# Patient Record
Sex: MALE | Race: WHITE
[De-identification: names, ages, dates, MRNs, and addresses within clinical notes are randomized per-mention and may not be internally consistent; named-entity substitution may affect disease eponyms.]

---

## 2023-06-16 ENCOUNTER — HOSPITAL ENCOUNTER (OUTPATIENT)
Dept: HOSPITAL 47 - EC | Age: 88
Setting detail: OBSERVATION
LOS: 1 days | Discharge: HOME HEALTH | End: 2023-06-17
Attending: HOSPITALIST | Admitting: HOSPITALIST
Payer: OTHER GOVERNMENT

## 2023-06-16 DIAGNOSIS — G89.29: ICD-10-CM

## 2023-06-16 DIAGNOSIS — Z95.5: ICD-10-CM

## 2023-06-16 DIAGNOSIS — Z79.82: ICD-10-CM

## 2023-06-16 DIAGNOSIS — Z95.2: ICD-10-CM

## 2023-06-16 DIAGNOSIS — I25.10: ICD-10-CM

## 2023-06-16 DIAGNOSIS — M48.061: ICD-10-CM
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
## 2023-06-16 DIAGNOSIS — Z91.81: ICD-10-CM

## 2023-06-16 DIAGNOSIS — I50.9: ICD-10-CM

## 2023-06-16 DIAGNOSIS — R03.0: ICD-10-CM

## 2023-06-16 DIAGNOSIS — Z95.1: ICD-10-CM

## 2023-06-16 DIAGNOSIS — Z95.0: ICD-10-CM

## 2023-06-16 DIAGNOSIS — E03.9: ICD-10-CM

## 2023-06-16 DIAGNOSIS — Z79.890: ICD-10-CM

## 2023-06-16 DIAGNOSIS — N40.0: ICD-10-CM

## 2023-06-16 DIAGNOSIS — S32.009A: Primary | ICD-10-CM

## 2023-06-16 DIAGNOSIS — Z79.899: ICD-10-CM

## 2023-06-16 DIAGNOSIS — E78.5: ICD-10-CM

## 2023-06-16 DIAGNOSIS — Z79.1: ICD-10-CM

## 2023-06-16 LAB
ALBUMIN SERPL-MCNC: 3.5 G/DL (ref 3.5–5)
ALP SERPL-CCNC: 154 U/L (ref 38–126)
ALT SERPL-CCNC: 29 U/L (ref 4–49)
ANION GAP SERPL CALC-SCNC: 6 MMOL/L
APTT BLD: 24.5 SEC (ref 22–30)
AST SERPL-CCNC: 35 U/L (ref 17–59)
BASOPHILS # BLD AUTO: 0 K/UL (ref 0–0.2)
BASOPHILS NFR BLD AUTO: 1 %
BUN SERPL-SCNC: 16 MG/DL (ref 9–20)
CALCIUM SPEC-MCNC: 8.6 MG/DL (ref 8.4–10.2)
CHLORIDE SERPL-SCNC: 102 MMOL/L (ref 98–107)
CO2 SERPL-SCNC: 31 MMOL/L (ref 22–30)
EOSINOPHIL # BLD AUTO: 0.1 K/UL (ref 0–0.7)
EOSINOPHIL NFR BLD AUTO: 2 %
ERYTHROCYTE [DISTWIDTH] IN BLOOD BY AUTOMATED COUNT: 3.79 M/UL (ref 4.3–5.9)
ERYTHROCYTE [DISTWIDTH] IN BLOOD: 15.1 % (ref 11.5–15.5)
GLUCOSE SERPL-MCNC: 99 MG/DL (ref 74–99)
HCT VFR BLD AUTO: 39.5 % (ref 39–53)
HGB BLD-MCNC: 12.6 GM/DL (ref 13–17.5)
INR PPP: 1.1 (ref ?–1.2)
LYMPHOCYTES # SPEC AUTO: 0.8 K/UL (ref 1–4.8)
LYMPHOCYTES NFR SPEC AUTO: 15 %
MAGNESIUM SPEC-SCNC: 2 MG/DL (ref 1.6–2.3)
MCH RBC QN AUTO: 33.3 PG (ref 25–35)
MCHC RBC AUTO-ENTMCNC: 31.9 G/DL (ref 31–37)
MCV RBC AUTO: 104.3 FL (ref 80–100)
MONOCYTES # BLD AUTO: 0.5 K/UL (ref 0–1)
MONOCYTES NFR BLD AUTO: 10 %
NEUTROPHILS # BLD AUTO: 3.6 K/UL (ref 1.3–7.7)
NEUTROPHILS NFR BLD AUTO: 71 %
PLATELET # BLD AUTO: 199 K/UL (ref 150–450)
POTASSIUM SERPL-SCNC: 4.1 MMOL/L (ref 3.5–5.1)
PROT SERPL-MCNC: 6.7 G/DL (ref 6.3–8.2)
PT BLD: 11.7 SEC (ref 9–12)
SODIUM SERPL-SCNC: 139 MMOL/L (ref 137–145)
WBC # BLD AUTO: 5.1 K/UL (ref 3.8–10.6)

## 2023-06-16 PROCEDURE — 85025 COMPLETE CBC W/AUTO DIFF WBC: CPT

## 2023-06-16 PROCEDURE — 81001 URINALYSIS AUTO W/SCOPE: CPT

## 2023-06-16 PROCEDURE — 80053 COMPREHEN METABOLIC PANEL: CPT

## 2023-06-16 PROCEDURE — 84484 ASSAY OF TROPONIN QUANT: CPT

## 2023-06-16 PROCEDURE — 85730 THROMBOPLASTIN TIME PARTIAL: CPT

## 2023-06-16 PROCEDURE — 83735 ASSAY OF MAGNESIUM: CPT

## 2023-06-16 PROCEDURE — 93005 ELECTROCARDIOGRAM TRACING: CPT

## 2023-06-16 PROCEDURE — 96374 THER/PROPH/DIAG INJ IV PUSH: CPT

## 2023-06-16 PROCEDURE — 84100 ASSAY OF PHOSPHORUS: CPT

## 2023-06-16 PROCEDURE — 85610 PROTHROMBIN TIME: CPT

## 2023-06-16 PROCEDURE — 99285 EMERGENCY DEPT VISIT HI MDM: CPT

## 2023-06-16 RX ADMIN — CEFAZOLIN SCH MLS/HR: 330 INJECTION, POWDER, FOR SOLUTION INTRAMUSCULAR; INTRAVENOUS at 20:04

## 2023-06-16 RX ADMIN — KETOROLAC TROMETHAMINE SCH: 15 INJECTION, SOLUTION INTRAMUSCULAR; INTRAVENOUS at 23:27

## 2023-06-17 VITALS
DIASTOLIC BLOOD PRESSURE: 49 MMHG | TEMPERATURE: 97.8 F | SYSTOLIC BLOOD PRESSURE: 97 MMHG | RESPIRATION RATE: 16 BRPM | HEART RATE: 64 BPM

## 2023-06-17 LAB
HYALINE CASTS UR QL AUTO: 4 /LPF (ref 0–2)
PH UR: 5 [PH] (ref 5–8)
PROT UR QL: (no result)
RBC UR QL: 3 /HPF (ref 0–5)
SP GR UR: 1.03 (ref 1–1.03)
UROBILINOGEN UR QL STRIP: <2 MG/DL (ref ?–2)
WBC #/AREA URNS HPF: 4 /HPF (ref 0–5)

## 2023-06-17 RX ADMIN — CEFAZOLIN SCH MLS/HR: 330 INJECTION, POWDER, FOR SOLUTION INTRAMUSCULAR; INTRAVENOUS at 08:40

## 2023-06-17 RX ADMIN — KETOROLAC TROMETHAMINE SCH: 15 INJECTION, SOLUTION INTRAMUSCULAR; INTRAVENOUS at 06:09

## 2023-06-17 RX ADMIN — FLUTICASONE PROPIONATE SCH: 110 AEROSOL, METERED RESPIRATORY (INHALATION) at 21:39

## 2023-06-17 RX ADMIN — KETOROLAC TROMETHAMINE SCH: 15 INJECTION, SOLUTION INTRAMUSCULAR; INTRAVENOUS at 17:27

## 2023-06-17 RX ADMIN — FLUTICASONE PROPIONATE SCH: 110 AEROSOL, METERED RESPIRATORY (INHALATION) at 07:32

## 2023-06-17 RX ADMIN — KETOROLAC TROMETHAMINE SCH: 15 INJECTION, SOLUTION INTRAMUSCULAR; INTRAVENOUS at 14:10

## 2023-06-17 NOTE — P.DS
Providers


Date of admission: 


06/16/23 18:57





Attending physician: 


Aster Shahid





Consults: 





                                        





06/16/23 18:54


Consult Physician Routine 


   Consulting Provider: SUSIE Galicia


   Consult Reason/Comments: backPain


   Do you want consulting provider notified?: Yes











Primary care physician: 


[facility] Course: 





88-year-old male came in with the sudden onset of lower back pain and the 

patient is found to have compression of lumbar vertebrae denied any lower 

extremity weakness or tingling numbness.  Patient is on scheduled Toradol with 

which his symptoms are improved.  Patient was evaluated by orthopedic surgery 

and recommended TLSO brace and if his pain is controlled on tramadol, patient 

can be discharged.  She apparently has history of congestive heart failure Lasix

with borderline blood pressure and normal kidney function.











REVIEW OF SYSTEMS: 


CONSTITUTIONAL: No fever, no malaise, no fatigue. 


HEENT: No recent visual problems or hearing problems. Denied any sore throat. 


CARDIOVASCULAR: No chest pain, orthopnea, PND, no palpitations, no syncope. 


PULMONARY: No shortness of breath, no cough, no hemoptysis. 


GASTROINTESTINAL: No diarrhea, no nausea, no vomiting, no abdominal pain. 


NEUROLOGICAL: No headaches, no weakness, no numbness. 


HEMATOLOGICAL: Denies any bleeding or petechiae. 


GENITOURINARY: Denies any burning micturition, frequency, or urgency. 


MUSCULOSKELETAL/RHEUMATOLOGICAL: As mentioned above 


ENDOCRINE: Denies any polyuria or polydipsia. 





The rest of the 14-point review of systems is negative.











PHYSICAL EXAMINATION: 





GENERAL: The patient is alert and oriented x3, not in any acute distress. Well 

developed, well nourished. 


HEENT: Pupils are round and equally reacting to light. EOMI. No scleral icterus.

No conjunctival pallor. Normocephalic, atraumatic. No pharyngeal erythema. No 

thyromegaly. 


CARDIOVASCULAR: S1 and S2 present. No murmurs, rubs, or gallops. 


PULMONARY: Chest is clear to auscultation, no wheezing or crackles. 


ABDOMEN: Soft, nontender, nondistended, normoactive bowel sounds. No palpable 

organomegaly. 


MUSCULOSKELETAL: No joint swelling or deformity.


EXTREMITIES: No cyanosis, clubbing, or pedal edema. 


NEUROLOGICAL: Gross neurological examination did not reveal any focal deficits. 


SKIN: No rashes. 





Assessment and plan


-Compression fracture: TLSO brace, tramadol, meloxicam as needed possibly of 

discharge with outpatient physical therapy


-Coronary artery disease


-Possible congestive heart failure although no echocardiogram is available 

unknown whether patient has systolic dysfunction and diastolic dysfunction 

presently not in acute exacerbation, patient will be continued on his home 

medications although his blood pressures are fairly borderline and there is a 

concern for falls


-History of valvular heart disease


Benign prostatic hypertrophy


-Hyperlipidemia


-Hypothyroidism


-Possible history of atrial fibrillation presently sinus rhythm, not on 

anticoagulation because of fall risk





Possibility of discharge today





Patient Condition at Discharge: Good





Plan - Discharge Summary


Discharge Rx Participant: No


New Discharge Prescriptions: 


New


   traMADol HCl [Ultram] 50 mg PO Q6HR PRN 3 Days #12 tab


     PRN Reason: Pain


   Meloxicam [Mobic] 15 mg PO DAILY PRN #30 tab


     PRN Reason: Pain





Continue


   Acetaminophen Tab [Tylenol] 650 mg PO Q4H PRN


     PRN Reason: Fever And/ Or Pain


   Amiodarone [Cordarone] 200 mg PO DAILY


   Ascorbic Acid [Vitamin C] 500 mg PO DAILY


   Aspirin EC [Ecotrin Low Dose] 81 mg PO DAILY


   Budesonide [Pulmicort Flexhaler] 2 puff INHALATION RT-BID


   Cholecalciferol [Vitamin D3 (25 Mcg = 1000 Iu)] 25 mcg PO DAILY


   Furosemide [Lasix] 60 mg PO AC-BRKFST


   Levalbuterol Hfa Inhaler [Xopenex Hfa Inhaler] 2 puff INHALATION RT-Q8H PRN


     PRN Reason: Shortness Of Breath


   Metoprolol Tartrate [Lopressor] 12.5 mg PO BID


   Multivit-Mins/Iron/Folic/Lycop [Centrum Men's Tablet] 1 tab PO DAILY


   Pantoprazole Sodium [Protonix] 40 mg PO DAILY


   Sennosides/Docusate Sodium [Senna-S 8.6-50 mg Tablet] 1 tab PO BID


   Tamsulosin HCl [Flomax] 0.4 mg PO DAILY


   Atorvastatin [Lipitor] 80 mg PO DAILY


   Isosorbide Mononitrate ER [Imdur] 30 mg PO DAILY


   Levothyroxine Sodium [Synthroid] 50 mcg PO DAILY


   Loratadine [Claritin] 10 mg PO DAILY


   Nitroglycerin Sl Tabs [Nitrostat] 0.4 mg SUBLINGUAL Q5M PRN


     PRN Reason: Chest Pain


   Potassium Chloride ER [K-Dur 20] 20 meq PO DAILY


   Ranolazine [Ranexa] 500 mg PO BID





Discontinued


   Zolpidem [Ambien] 10 mg PO HS PRN


     PRN Reason: Insomnia


Discharge Medication List





Acetaminophen Tab [Tylenol] 650 mg PO Q4H PRN 06/16/23 [History]


Amiodarone [Cordarone] 200 mg PO DAILY 06/16/23 [History]


Ascorbic Acid [Vitamin C] 500 mg PO DAILY 06/16/23 [History]


Aspirin EC [Ecotrin Low Dose] 81 mg PO DAILY 06/16/23 [History]


Atorvastatin [Lipitor] 80 mg PO DAILY 06/16/23 [History]


Budesonide [Pulmicort Flexhaler] 2 puff INHALATION RT-BID 06/16/23 [History]


Cholecalciferol [Vitamin D3 (25 Mcg = 1000 Iu)] 25 mcg PO DAILY 06/16/23 

[History]


Furosemide [Lasix] 60 mg PO AC-BRKFST 06/16/23 [History]


Isosorbide Mononitrate ER [Imdur] 30 mg PO DAILY 06/16/23 [History]


Levalbuterol Hfa Inhaler [Xopenex Hfa Inhaler] 2 puff INHALATION RT-Q8H PRN 

06/16/23 [History]


Levothyroxine Sodium [Synthroid] 50 mcg PO DAILY 06/16/23 [History]


Loratadine [Claritin] 10 mg PO DAILY 06/16/23 [History]


Metoprolol Tartrate [Lopressor] 12.5 mg PO BID 06/16/23 [History]


Multivit-Mins/Iron/Folic/Lycop [Centrum Men's Tablet] 1 tab PO DAILY 06/16/23 

[History]


Nitroglycerin Sl Tabs [Nitrostat] 0.4 mg SUBLINGUAL Q5M PRN 06/16/23 [History]


Pantoprazole Sodium [Protonix] 40 mg PO DAILY 06/16/23 [History]


Potassium Chloride ER [K-Dur 20] 20 meq PO DAILY 06/16/23 [History]


Ranolazine [Ranexa] 500 mg PO BID 06/16/23 [History]


Sennosides/Docusate Sodium [Senna-S 8.6-50 mg Tablet] 1 tab PO BID 06/16/23 

[History]


Tamsulosin HCl [Flomax] 0.4 mg PO DAILY 06/16/23 [History]


Meloxicam [Mobic] 15 mg PO DAILY PRN #30 tab 06/17/23 [Rx]


traMADol HCl [Ultram] 50 mg PO Q6HR PRN 3 Days #12 tab 06/17/23 [Rx]








Follow up Appointment(s)/Referral(s): 


SUSIE Galicia,  [Doctor of Osteopathic Medicine] - 2 Weeks (or sooner if 

patient is having any problems)


None,Stated [REFERRING] - 1-2 days


Activity/Diet/Wound Care/Special Instructions: 


LSO brace to be worn when patient is out of bed


May remove in bed or may wear in bed if patient wishes


May remove for bathing


May ambulate as tolerated with brace on


Avoid any heavy rigorous activity


Avoid any repetitive bending


Avoid lifting greater than 15 pounds


No overhead work


Discharge Disposition: HOME SELF-CARE

## 2023-06-17 NOTE — P.CNOR
History of Present Illness





- [facility]


Consult date: 06/17/23


Consult reason: low back pain


History of present illness: 





The patient is seen and examined at bedside.  He is a very pleasant 88-year-old 

male who lives on his own with his son right next or checks in on him daily.  

The patient just returned back from [place] but when he went to take a shower 

about 4 weeks ago he had a fall and had sudden acute pain at his lower back.  

The pain has persisted and feels though it had gotten worse and presented to the

hospital.  He underwent imaging last week at [facility].  He was found to 

have evidence of a compression deformity and was sent into the hospital.


The patient is not having any lower extremity numbness tingling.  He is not 

having change in bowel bladder function.  He says the pain is primarily at the 

midline of his lumbosacral junction and across his lower back.  It is 

particularly bad when he changed physicians and try to sit up at the side of the

bed.  He is not having pain when seated or when lying down.  He denies pain with

coughing or sneezing.





He denies problems with his back in the past.  He says he does have some 

soreness at his legs when he tries to stand up and walk for significant period 

time.  He denies any new weakness





Review of Systems





Denies chest pain shortness breath.  Denies nausea or vomiting.  Denies any 

fevers chills.  He admits to fall several weeks ago.  He says he normally gets 

around home well uses a walker to help get around.  He lives on his own at home 

and feels that he is able to take care of the house but he has family very close

by who visits him each day.





Past Medical History


Past Medical History: Coronary Artery Disease (CAD)


Additional Past Medical History / Comment(s): Status post fall about 4 weeks ago

with new back pain


History of Any Multi-Drug Resistant Organisms: None Reported


Past Surgical History: Coronary Bypass/CABG, Heart Catheterization With Stent, 

Pacemaker


Additional Past Surgical History / Comment(s): heart valve replacemnt


Date of Last Stent Placement:: n/a


Type of Cardiac Device: Permanent Pacemaker


Device Placement Date:: n/a


Past Psychological History: No Psychological Hx Reported


Past Alcohol Use History: Rare


Past Drug Use History: None Reported





Medications and Allergies


                                Home Medications











 Medication  Instructions  Recorded  Confirmed  Type


 


Acetaminophen Tab [Tylenol] 650 mg PO Q4H PRN 06/16/23 06/16/23 History


 


Amiodarone [Cordarone] 200 mg PO DAILY 06/16/23 06/16/23 History


 


Ascorbic Acid [Vitamin C] 500 mg PO DAILY 06/16/23 06/16/23 History


 


Aspirin EC [Ecotrin Low Dose] 81 mg PO DAILY 06/16/23 06/16/23 History


 


Atorvastatin [Lipitor] 80 mg PO DAILY 06/16/23 06/16/23 History


 


Budesonide [Pulmicort Flexhaler] 2 puff INHALATION RT-BID 06/16/23 06/16/23 

History


 


Cholecalciferol [Vitamin D3 (25 25 mcg PO DAILY 06/16/23 06/16/23 History





Mcg = 1000 Iu)]    


 


Furosemide [Lasix] 60 mg PO AC-BRKFST 06/16/23 06/16/23 History


 


Isosorbide Mononitrate ER [Imdur] 30 mg PO DAILY 06/16/23 06/16/23 History


 


Levalbuterol Hfa Inhaler [Xopenex 2 puff INHALATION RT-Q8H PRN 06/16/23 06/16/23

 History





Hfa Inhaler]    


 


Levothyroxine Sodium [Synthroid] 50 mcg PO DAILY 06/16/23 06/16/23 History


 


Loratadine [Claritin] 10 mg PO DAILY 06/16/23 06/16/23 History


 


Metoprolol Tartrate [Lopressor] 12.5 mg PO BID 06/16/23 06/16/23 History


 


Multivit-Mins/Iron/Folic/Lycop 1 tab PO DAILY 06/16/23 06/16/23 History





[Centrum Men's Tablet]    


 


Nitroglycerin Sl Tabs [Nitrostat] 0.4 mg SUBLINGUAL Q5M PRN 06/16/23 06/16/23 

History


 


Pantoprazole Sodium [Protonix] 40 mg PO DAILY 06/16/23 06/16/23 History


 


Potassium Chloride ER [K-Dur 20] 20 meq PO DAILY 06/16/23 06/16/23 History


 


Ranolazine [Ranexa] 500 mg PO BID 06/16/23 06/16/23 History


 


Sennosides/Docusate Sodium 1 tab PO BID 06/16/23 06/16/23 History





[Senna-S 8.6-50 mg Tablet]    


 


Tamsulosin HCl [Flomax] 0.4 mg PO DAILY 06/16/23 06/16/23 History


 


Zolpidem [Ambien] 10 mg PO HS PRN 06/16/23 06/16/23 History








                                    Allergies











Allergy/AdvReac Type Severity Reaction Status Date / Time


 


No Known Allergies Allergy   Verified 06/16/23 19:49














Physical Examination


Osteopathic Statement: *.  No significant issues noted on an osteopathic 

structural exam other than those noted in the History and Physical/Consult.





- L Spine: dermatomal strength & reflexes


  ** bilateral


Strength: hip flexion: 5/5 (He has bruising over his pelvis toward left.  There 

is open area of burn about 3 x 3 cm from a heating pad.  There is no erythema 

there is no drainage.)





  ** left


Strength: hip flexion: 5/5 (His lower extremities have 5 out of 5 strength 

throughout.  No pain with internal rotation is hips.  Thighs Soft nontender.  

There is ecchymosis over his left gluteal area.)





Results





- Labs


Labs: 


                  Abnormal Lab Results - Last 24 Hours (Table)











  06/16/23 06/16/23 Range/Units





  19:35 19:35 


 


RBC  3.79 L   (4.30-5.90)  m/uL


 


Hgb  12.6 L   (13.0-17.5)  gm/dL


 


MCV  104.3 H   (80.0-100.0)  fL


 


Lymphocytes #  0.8 L   (1.0-4.8)  k/uL


 


Carbon Dioxide   31 H  (22-30)  mmol/L


 


Alkaline Phosphatase   154 H  ()  U/L








                                      H & H











  06/16/23 Range/Units





  19:35 


 


Hgb  12.6 L  (13.0-17.5)  gm/dL


 


Hct  39.5  (39.0-53.0)  %








                                   Coagulation











  06/16/23 Range/Units





  19:35 


 


INR  1.1  (<1.2)  











Result Diagrams: 


                                 06/16/23 19:35





                                 06/16/23 19:35





- Diagnostic results


CT Scan - lumbar: report reviewed (I do not have the report available.  There is

 evidence of severe central and bilateral foraminal stenosis L3 4 L4 5 with 

facet arthrosis.), image reviewed (Computed tomography scan from 06/09/2023 at 

[facility] shows his lumbar spine with evidence of compression deformity at 

L5.  There is some lumbarization of S1.  There is fracture line at the superior 

endplate and about 10% height loss.  There is some evidence of significant 

central bilateral for)





Assessment and Plan


Assessment: 





Subacute low back pain


L4 5 compression fracture with about 10% height loss due to a traumatic fall 

about 4 weeks ago


Spinal stenosis L3 4 L4 5


Difficulty ambulating and mobilizing


Superficial burn due to a heating pad at the left lower back


Ecchymosis over the gluteal area


Plan: 





Subacute low back pain


L5 compression fracture with about 10% height loss due to a traumatic fall about

 4 weeks ago


Spinal stenosis L3 4 L4 5


Difficulty ambulating and mobilizing


Superficial burn due to a heating pad at the left lower back


Ecchymosis over the gluteal area





The patient has new low back pain due to compression fracture L4.  This has made

 it difficult for him to mobilize and ambulate.  The fracture does not appear to

 be causing any new neurologic change or neurologic deficit.  He does have 

evidence of significant central and bilateral foraminal stenosis at L3 4 and L4 

5 which is chronic for him.  It had previously been minimally symptomatic 

although I think the fall and his new injury has exacerbated some of the 

arthritic and chronic changes to become somewhat symptomatic from his stenosis. 

 We discussed this with him today length and discuss it with his son over the 

phone at bedside.





I think that we need to manage the fracture currently to allow to stabilize 

before we can address the issues of stenosis.  He is not having neurologic F sit

 or neurologic change.  I discussed the different treatment options in regards 

to his L5 compression fracture.  I think that he can do well with an LSO brace 

for his lower back and continue mobilization with therapy.  He may do well with 

some pain control as well. 





If he is not doing well with conservative treatment and bracing I did discuss 

the possibility of kyphoplasty.  For now we will like to try to avoid surgical 

intervention as he does have a open sore at his lower back and with his advanced

 age.  Try to avoid surgical intervention if possible.  Kyphoplasty could be an 

option if he is not having significant improvement, but we'll initiate dedicated

 conservative treatment at this point and see how he does.  We will continue to 

follow him along with you. 





 I will order an LSO brace and pain control for him.  We will also have therapy 

work with him for mobilization and assess his home situation with case 

management for potential discharge if his pain is controlled.


Time with Patient: Greater than 30

## 2023-06-17 NOTE — P.HPIM
History of Present Illness


88-year-old male came in with the sudden onset of lower back pain and the 

patient is found to have compression of lumbar vertebrae denied any lower 

extremity weakness or tingling numbness.  Patient is on scheduled Toradol with 

which his symptoms are improved.  Patient was evaluated by orthopedic surgery 

and recommended TLSO brace and if his pain is controlled on tramadol, patient 

can be discharged.  She apparently has history of congestive heart failure Lasix

with borderline blood pressure and normal kidney function.











REVIEW OF SYSTEMS: 


CONSTITUTIONAL: No fever, no malaise, no fatigue. 


HEENT: No recent visual problems or hearing problems. Denied any sore throat. 


CARDIOVASCULAR: No chest pain, orthopnea, PND, no palpitations, no syncope. 


PULMONARY: No shortness of breath, no cough, no hemoptysis. 


GASTROINTESTINAL: No diarrhea, no nausea, no vomiting, no abdominal pain. 


NEUROLOGICAL: No headaches, no weakness, no numbness. 


HEMATOLOGICAL: Denies any bleeding or petechiae. 


GENITOURINARY: Denies any burning micturition, frequency, or urgency. 


MUSCULOSKELETAL/RHEUMATOLOGICAL: As mentioned above 


ENDOCRINE: Denies any polyuria or polydipsia. 





The rest of the 14-point review of systems is negative.











PHYSICAL EXAMINATION: 





GENERAL: The patient is alert and oriented x3, not in any acute distress. Well 

developed, well nourished. 


HEENT: Pupils are round and equally reacting to light. EOMI. No scleral icterus.

No conjunctival pallor. Normocephalic, atraumatic. No pharyngeal erythema. No 

thyromegaly. 


CARDIOVASCULAR: S1 and S2 present. No murmurs, rubs, or gallops. 


PULMONARY: Chest is clear to auscultation, no wheezing or crackles. 


ABDOMEN: Soft, nontender, nondistended, normoactive bowel sounds. No palpable 

organomegaly. 


MUSCULOSKELETAL: No joint swelling or deformity.


EXTREMITIES: No cyanosis, clubbing, or pedal edema. 


NEUROLOGICAL: Gross neurological examination did not reveal any focal deficits. 


SKIN: No rashes. 





Assessment and plan


-Compression fracture: TLSO brace, tramadol, meloxicam as needed possibly of 

discharge with outpatient physical therapy


-Coronary artery disease


-Possible congestive heart failure although no echocardiogram is available 

unknown whether patient has systolic dysfunction and diastolic dysfunction 

presently not in acute exacerbation, patient will be continued on his home 

medications although his blood pressures are fairly borderline and there is a 

concern for falls


-History of valvular heart disease


Benign prostatic hypertrophy


-Hyperlipidemia


-Hypothyroidism


-Possible history of atrial fibrillation presently sinus rhythm, not on 

anticoagulation because of fall risk





Possibility of discharge today








Past Medical History


Past Medical History: Coronary Artery Disease (CAD)


Additional Past Medical History / Comment(s): Status post fall about 4 weeks ago

with new back pain


History of Any Multi-Drug Resistant Organisms: None Reported


Past Surgical History: Coronary Bypass/CABG, Heart Catheterization With Stent, 

Pacemaker


Additional Past Surgical History / Comment(s): heart valve replacemnt


Date of Last Stent Placement:: n/a


Type of Cardiac Device: Permanent Pacemaker


Device Placement Date:: n/a


Past Psychological History: No Psychological Hx Reported


Past Alcohol Use History: Rare


Past Drug Use History: None Reported





Medications and Allergies


                                Home Medications











 Medication  Instructions  Recorded  Confirmed  Type


 


Acetaminophen Tab [Tylenol] 650 mg PO Q4H PRN 06/16/23 06/16/23 History


 


Amiodarone [Cordarone] 200 mg PO DAILY 06/16/23 06/16/23 History


 


Ascorbic Acid [Vitamin C] 500 mg PO DAILY 06/16/23 06/16/23 History


 


Aspirin EC [Ecotrin Low Dose] 81 mg PO DAILY 06/16/23 06/16/23 History


 


Atorvastatin [Lipitor] 80 mg PO DAILY 06/16/23 06/16/23 History


 


Budesonide [Pulmicort Flexhaler] 2 puff INHALATION RT-BID 06/16/23 06/16/23 

History


 


Cholecalciferol [Vitamin D3 (25 25 mcg PO DAILY 06/16/23 06/16/23 History





Mcg = 1000 Iu)]    


 


Furosemide [Lasix] 60 mg PO AC-BRKFST 06/16/23 06/16/23 History


 


Isosorbide Mononitrate ER [Imdur] 30 mg PO DAILY 06/16/23 06/16/23 History


 


Levalbuterol Hfa Inhaler [Xopenex 2 puff INHALATION RT-Q8H PRN 06/16/23 06/16/23

 History





Hfa Inhaler]    


 


Levothyroxine Sodium [Synthroid] 50 mcg PO DAILY 06/16/23 06/16/23 History


 


Loratadine [Claritin] 10 mg PO DAILY 06/16/23 06/16/23 History


 


Metoprolol Tartrate [Lopressor] 12.5 mg PO BID 06/16/23 06/16/23 History


 


Multivit-Mins/Iron/Folic/Lycop 1 tab PO DAILY 06/16/23 06/16/23 History





[Centrum Men's Tablet]    


 


Nitroglycerin Sl Tabs [Nitrostat] 0.4 mg SUBLINGUAL Q5M PRN 06/16/23 06/16/23 

History


 


Pantoprazole Sodium [Protonix] 40 mg PO DAILY 06/16/23 06/16/23 History


 


Potassium Chloride ER [K-Dur 20] 20 meq PO DAILY 06/16/23 06/16/23 History


 


Ranolazine [Ranexa] 500 mg PO BID 06/16/23 06/16/23 History


 


Sennosides/Docusate Sodium 1 tab PO BID 06/16/23 06/16/23 History





[Senna-S 8.6-50 mg Tablet]    


 


Tamsulosin HCl [Flomax] 0.4 mg PO DAILY 06/16/23 06/16/23 History


 


Zolpidem [Ambien] 10 mg PO HS PRN 06/16/23 06/16/23 History


 


Meloxicam [Mobic] 15 mg PO DAILY PRN #30 tab 06/17/23  Rx


 


traMADol HCl [Ultram] 50 mg PO Q6HR PRN 3 Days #12 tab 06/17/23  Rx








                                    Allergies











Allergy/AdvReac Type Severity Reaction Status Date / Time


 


No Known Allergies Allergy   Verified 06/16/23 19:49














Physical Exam


Vitals: 


                                   Vital Signs











  Temp Pulse Pulse Resp BP BP Pulse Ox


 


 06/17/23 07:00  97.6 F   66  14   106/54  93 L


 


 06/17/23 03:10  97.8 F   70  16   95/55  94 L


 


 06/17/23 02:00    78  18   


 


 06/16/23 22:05  97.5 F L   78  18   135/70  97


 


 06/16/23 20:02   72    121/65   95


 


 06/16/23 17:55  97.3 F L  77   20  115/61   94 L








                                Intake and Output











 06/16/23 06/17/23 06/17/23





 22:59 06:59 14:59


 


Intake Total   118


 


Balance   118


 


Intake:   


 


  Oral   118


 


Other:   


 


  # Voids  1 


 


  Weight 95.254 kg  














Results


CBC & Chem 7: 


                                 06/16/23 19:35





                                 06/16/23 19:35


Labs: 


                  Abnormal Lab Results - Last 24 Hours (Table)











  06/16/23 06/16/23 06/17/23 Range/Units





  19:35 19:35 10:10 


 


RBC  3.79 L    (4.30-5.90)  m/uL


 


Hgb  12.6 L    (13.0-17.5)  gm/dL


 


MCV  104.3 H    (80.0-100.0)  fL


 


Lymphocytes #  0.8 L    (1.0-4.8)  k/uL


 


Carbon Dioxide   31 H   (22-30)  mmol/L


 


Alkaline Phosphatase   154 H   ()  U/L


 


Urine Protein    1+ H  (Negative)  


 


Amorphous Sediment    Rare H  (None)  /hpf


 


Hyaline Casts    4 H  (0-2)  /lpf


 


Urine Mucus    Many H  (None)  /hpf














Thrombosis Risk Factor Assmnt





- Choose All That Apply


Any of the Below Risk Factors Present?: Yes


Each Factor Represents 1 point: Obesity (BMI >25)


Other Risk Factors: Yes


Each Risk Factor Represents 3 Points: Age 75 years or older


Other congenital or acquired thrombophilia - If yes, enter type in comment: No


Thrombosis Risk Factor Assessment Total Risk Factor Score: 4


Thrombosis Risk Factor Assessment Level: Moderate Risk

## 2023-08-18 ENCOUNTER — HOSPITAL ENCOUNTER (OUTPATIENT)
Dept: HOSPITAL 47 - LABPAT | Age: 88
Discharge: HOME | End: 2023-08-18
Attending: ORTHOPAEDIC SURGERY
Payer: OTHER GOVERNMENT

## 2023-08-18 DIAGNOSIS — R94.31: ICD-10-CM

## 2023-08-18 DIAGNOSIS — Z01.818: Primary | ICD-10-CM

## 2023-08-18 DIAGNOSIS — I45.10: ICD-10-CM

## 2023-08-18 DIAGNOSIS — I49.8: ICD-10-CM

## 2023-08-18 DIAGNOSIS — M48.56XA: ICD-10-CM

## 2023-08-18 LAB
ANION GAP SERPL CALC-SCNC: 11.8 MMOL/L (ref 4–12)
APTT BLD: 25.3 SEC (ref 22–30)
BUN SERPL-SCNC: 32.8 MG/DL (ref 9–27)
CHLORIDE SERPL-SCNC: 90 MMOL/L (ref 96–109)
CO2 SERPL-SCNC: 34.2 MMOL/L (ref 21.6–31.8)
GLUCOSE SERPL-MCNC: 114 MG/DL (ref 70–110)
INR PPP: 1 (ref ?–1.2)
MAGNESIUM SPEC-SCNC: 2.4 MG/DL (ref 1.5–2.4)
POTASSIUM SERPL-SCNC: 3.1 MMOL/L (ref 3.5–5.5)
PT BLD: 10.9 SEC (ref 9–12)
SODIUM SERPL-SCNC: 136 MMOL/L (ref 135–145)

## 2023-08-18 PROCEDURE — 84520 ASSAY OF UREA NITROGEN: CPT

## 2023-08-18 PROCEDURE — 83735 ASSAY OF MAGNESIUM: CPT

## 2023-08-18 PROCEDURE — 82565 ASSAY OF CREATININE: CPT

## 2023-08-18 PROCEDURE — 93005 ELECTROCARDIOGRAM TRACING: CPT

## 2023-08-18 PROCEDURE — 85025 COMPLETE CBC W/AUTO DIFF WBC: CPT

## 2023-08-18 PROCEDURE — 85730 THROMBOPLASTIN TIME PARTIAL: CPT

## 2023-08-18 PROCEDURE — 85610 PROTHROMBIN TIME: CPT

## 2023-08-18 PROCEDURE — 80051 ELECTROLYTE PANEL: CPT

## 2023-08-18 PROCEDURE — 82947 ASSAY GLUCOSE BLOOD QUANT: CPT

## 2023-08-18 NOTE — P.HPOR
History of Present Illness


H&P Date: 23





.D:Date: 23 : 01:30pm


.T:Title: [facility] 





Date of Birth:[birth year]





[id number]: NKDA





Age: 88 year


Height: 5'10"


Weight: 182 lbs


BMI: 26.11 kg/m2


Occupation: 


VAS:





CHIEF 

COMPLAINT:


 


Low back pain 





DOI:





DOS:





Duration of current treatment regiment: DEL *





HISTORY:

 











Xrays


 New xrays taken in office 


 


Trauma or injury


 No 


 


Work-Related


 No 


 


Pain description


 Dull & aching


 


Location


 [facility] 





 


Activity Modification Yes 


 


Hand Dominance


 Right 








TREATMENTS COMPLETED:











6 weeks of PT completed?





Month and Year of last PT date? No 











 


Physician directed home exercise completed?


 No


 


Medications Yes, List: Norco, Tramadol, Motrin, Gabapentin, Flexeril





 


Alternative interventions Chiropractic: No


Massage therapy:No


R.I.C.E: YES


Brace:YES LSO, Does not help 


 


Injections No 


RFA:No








SUBJECTIVE:

 


Mr. Junior presents to the office for an evaluation of his low back pain and for

surgicla discussion.  He states pain in his back that is constant and not 

getting any better.  He is with his son who states about 5 weeks ago he had a 

fall at home and has been in constant pain ever since.  He was seen in ED up in 

[place] and they treated him with pain meds and an LSO brace which he has been 

wearing.  He states when he sits his pain is a 4 and when he gets up or moves at

all its 10/10.  He states weakness and numbness in his legs as well which may or

maynot be related.  His son states he sits most of the day now but before this 

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 

dealership.  But this has limited him extremly.  He has tried Norco, Tramadol, 

Flexeril, Gabapentin without imrpvement.  He would like to discuss surgical 

treatment as the brace is not helping him either. 





Otherwise the patient denies any f/c/sob/cp, no incision concerns, no bladder or

bowel retention/incontinence, no perineal numbness/tingling, and ambulates 

independently. 


 


The patients' past social, medical, family, surgical history, as well as review 

of systems, have been reviewed. Please refer to the Neurosurgery History and 

Physical form that has been scanned in to our electronic medical record system.





14 points review of systems completed and as stated in HPI, all other systems 

reviewed are negative. 





Social History: 


G5Adcbmbn: never a smoker P3 


Alcohol: none 





Family History: 


R1Vhnojn:  P2 


Father:  


Sibling(s): 





Past Medical History: Reviewed, see appropriate section of the chart for 

details. 





Past Surgical History: Reviewed, see appropriate section of the chart for 

details. 





Current Medications: Reviewed, see appropriate section of the chart for details.




P1 


PHYSICAL EXAMINATION:




 


General: DEL Awake, alert, appropriate for age, in no acute distress. 


HEENT: DEL No unusual neck masses around region of lateral neck triangle, 

thyroid, supraclavicular groove 





Extremities: DELSkin warm and dry without acute lesions, coloration, 

temperature, skin intact, no tenderness or erythema * 





Integument: 


Hairy patches: DEL * ABSENT


Dorsal skin dimples: DEL * ABSENT


Cafe au lait spots: DEL * ABSENT


Surgical incisions: DEL * NONE





Palpation: 


Please see Pain drawing on Intake sheet for further detail. 


* Midline spinal tenderness: YES at L4 region E6


Cervical Tenderness: No E6


Paralumbar tenderness: YES E6 


Parathoracic tenderness: No E6


Buttocks tenderness: No E6


Sacroilliac Tenderness: DEL YES No Laterality: DEL * 





POSTURAL and MUSCULO-SKELETAL EVALUATION: 


Coronal Balance: DEL * NEUTRAL


Recumbent testing: DEL Patient is * able to lay flat on back 


Sagittal Balance: DEL * NEUTRAL


Shoulder Profile: DEL * LEVEL


Pelvic Girdle: DEL * LEVEL


Neck ROM: DEL * UNRESTRICTED


Lumbar ROM: DEL * RESTRICTED


Shoulder ROM: DEL * Symmetrical


Hip ROM: DEL * Symmetrical


Knee ROM: DEL * Symmetrical


Hands: DEL * Normal appearance, symmetrical


Feet: DEL * Normal appearance, Symmetrical





VASCULAR STATUS :


  LEFT

 RIGHT 











Wrist Pulses * INTACT * INTACT


 


Pedal Pulses 


(Dors. pedis & post.tibialis)


 * INTACT


 * INTACT


 


Color  * NORMAL  * NORMAL


 


Edema Absent PRESENT  Absent PRESENT 








NEUROLOGIC EXAMINATION: 





Mental Status:Awake and alert, fully oriented, with normal attention, 

concentration and memory, and fluent, appropriate speech. 





Cranial Nerves: 


I: Olfactory not tested. 


II: Visual acuity normal, no visual field deficit noted with confrontation. 


III,IV: Normal pupillary reflexes & intact extraocular movements without 

nystagmus. 


V,VI: Intact symmetrical facial sensation. 


VII: Intact symmetrical facial motor movement 


VIII: Hearing intact. 


IX,X: Intact gag, swallow, & normal voice. 


XI: Sternocleidomastoid, trapezius function intact. 


XII: Tongue midline with normal movements. 





L'hermitte's Sign: DEL Negative / absent 


Spurling'Sign: DEL Absent bilaterally.  


Cubital percussion test: DEL Absent bilaterally. 


Johnston-Tinel sign - Carpal region: DEL Absent bilaterally. 


Straight Leg Raising: DEL Absent bilaterally. * 


Crossed straight leg raise: DEL negative positive O8 + test in affected 

leg while raising opposite leg, MORE SPECIFIC than regular straight leg raise 








MOTOR EXAM (0-5/5, N/T





Muscle appearance: DEL *Symmetrical, without signs of atrophy or dystrophy











UPPER EXTREMITY RIGHT LEFT


 


Shoulder Abduction *5/5 *5/5


 


Biceps * */


 


Triceps */ *5/5


 


Wrist Extension */ *5/5


 


Hand Intrnsics * */


 


 * */








Hand and finger dexterity intact bilaterally? DEL YES NO 


Disdiadochokinesis examination negative bilaterally? DEL YES NO











LOWER EXTREMITY RIGHT LEFT


 


Hip Flexion *4/5 *4/5


 


Knee Extension */5 *4/5


 


Knee Flexion */ */5


 


Dorsiflexion */5 *4/5


 


Plantarflexion */5 *4/5


 


EHL */5 *4/5


 


FHL */5 *4/5


 


  








Toe heel walk / heel-toe walk intact while maintaining satisfactory balance? 

DELYES No 


Squatting/straightening w/o assistance to a min of 60 degree knee flexion?DEL 

YES No  


Single leg stance: DEL INTACT TRENDELENBURG NEG unable to do due to 

pain





REFLEXES(0-4/2, NT)Upper 

ExtremityLower Extremity











Right * 2 * 2


 


Left * 2 * 2 











Pathological Reflexes 

RIGHT LEFT











Johnston's Absent PRESENT Absent PRESENT


 


Clonus Absent #BEATS Absent #BEATS


 


Babinski Absent PRESENT Absent PRESENT











Sensory system (0-4, N/T) 











                              Test type RU TERA RL LL


 


Joint-Position *2 *2  *2  *2 


 


Vibration *2  *2  *2  *2 


 


Pain & LT sense *2  *2  *2  *2 


 


Dermatomal Deficit:


 * None


 *None 














Gait and Functional Evaluation: 


Ambulatory aids: DEL Walker 


Romberg's test: DEL Intact bilaterally 


*Steady/unsteady Gait 





RADIOGRAPHIC 

STUDIES:


 





XRay  CAP/L TAP/L Lumbar AP/lateral 2 views   LMVP CMV   taken at  

[facility] on 23 of Lumbar Spine: *


Images reviewed


segmentation difference with sacralization of what woud be L5, CT image read at 

L5 fracture and so to stay congruent will name level this.  





L5 compression fracture that has increased progressively since injury with near 

50% collapse now from 20% previously.  


Severe spondylosis through lumbar spine with flattented lordosis.  No other 

fractues evident.  





AP pelvis shows congruent level pevis w/o fracture





CT scancompleted at  [facility] Outside facility  from *23 of Lumbar 

Spine: * 


Images reviewed from OSH. 


L5 bust compression type fracture with <20% collapse.  No other fractures.  

















IMPRESSION:

 





It was my pleasure to have seen and examined Milton. I reviewed the patient's 

clinical syndrome, physical findings, and imaging studies during the appointment

today. It is my impression that the patient has a diagnosis of. 





1. L5 compression fracture 50% progressive deformity





2. Low back pain





3. Debility





I outlined the natural course history without intervention and various 

interventional options. 





 


 

PLAN:


 





Based on my findings I suggest the following course of action: 





-* We discussed all options for treatment at [facility].  They have decided to

persue surgical treatment.  They understand that Milton is a high risk for 

surgery and post op/perioperative issues related to his age, and multiple 

comorbid conditions and are willing to assume all the risks of surgery. 





-Physical Therapy1 





-HOMEEX 





-HEALTHMA 





-CONTPRO 





- SMCESSFU 





- SMCESSNP 





- WEIGHTLOSSCOUNS 





- SUPP





- EWP





PM&R CONSULT UNI





-


I discussed treatment options with the patient, including operative and non-

operative options, and they have elected to proceed with the following surgical 

procedure: 





  cervical  thoracic   lumbar   BACKSURGT...  L5 kyphoplasty with biopsy





The indications, risks, benefits, and alternatives to surgery were discussed 

with the patient and family at length. Specifically (but not limited to) the 

risks of infection, stiffness, recurrence of symptoms, need for revision 

surgery, local numbness, neurovascular injury, and blood clots were discussed.  

The patient's questions were answered.CALL BACK The decision to proceed was 

made. Consent will be obtained for the procedure. 





-RXGIVEN MRIAPPTL  Brace Fitting knee brace Wear brace as directed 

while up and about, riding in cars or long walks.  Do not wear while sleeping 

and showering.  Liftingreturnact offwork returnwork casemgr DEL 





-Ambulate daily 





-Take medications as directed





-Ice and rest for pain and swelling control.





                            Spine Surgery Risk Review


Mr. Junior is presenting for evaluation of low back pain, continusous severe, 

progressive with debility. It was my pleasure to have seen and examined Mr. Junior. 


In our visit today we have had a chance to go over subjective complaints, 

physical examination findings and treatments including the natural course 

history without intervention and various interventional options. The patients 

imaging demonstrates: L5 VCF with 50% collapase and progressive deformity. 


On physical exam, Mr. Junior demonstrates: severe low back pain, TTP around L5 

midline and debility due to fracture and pain. 


I have explained to the patient that as their condition progresses it will cause

further neurological deficits and eventual paralysis. Based on the patients 

imaging, physical exam, and the rapid progression and disabling nature of their 

symptoms, at this time I recommend surgery in the form of a: L5 kyphoplasty with

biopsy BACKSURGT.... I discussed the risk and benefits of this procedure at 

length with Mr. Junior. The patient son agreed to considered pursuing the 

procedure abovementioned. Prior to surgery, she should follow up with her PCP 

(Cardio, ID, IM etc) for clearance. Questions were invited and answered, and the

patient wishes to proceed as outlined below. 


Currently, I am recommendin.L5  Kyphoplasty with biopsy


2.Follow up with PCP for surgical clearance


3.Review of surgical risks and benefits as well as an educational packet on 

the proposed surgical procedure.





Risks: 


All surgical procedures come with inherent risks, including those related to 

positioning, anesthesia, intraoperative findings, and postoperative complicati

ons. It is important to understand that surgery does not come with any 

guarantee of a successful outcome as complications and adverse events are always

possible. 


The patient was given a handout in office today discussing the surgical 

procedure and risks associated with the intervention, both of which were 

discussed with the patient. These risks include but are not limited to the 

following:


*  Experiencing same, different or even worse symptoms in back, neck, arms, 

  or legs compared to before surgery. 


 Requiring further surgery or other forms of treatment presently or at some 

time in the future at same or other levels of the intended spine surgery. 


 On an extreme but fortunately relatively rare basis severe complication 

such as blindness, stroke, heart attack, temporary and/or permanent nerve 

injury, paralysis, coma, or death may occur, sometimes without known 

explanation. 


 Surgical complications may include but are not limited to risk of 

infection, fluid accumulation in the surgical dissection site, including a 

seroma or hematoma, that requires additional surgery, wound drainage, bleeding, 

new numbness or weakness, vision changes/loss, spinal fluid leakage, non-healing

and/or infected incision, headaches, difficulty or inability to swallow, 

hoarseness, hemopneumothorax, pneumothorax, impotence, retrograde ejaculation, 

vaginal dryness; injury to nerves, spinal cord, blood vessels, lymphatics or 

other vital organs (i.e., bowel injury, injury to the great vessels); 

heterotopic bone formation; complications related to the hardware such as 

screws, rods, cages including misplaced hardware, device failure, 

instrumentation at the wrong spine level, hardware fracture/breakage, or 

hardware loosening; vertebral failure of the spinal column above or below the 

newly placed hardware; retained surgical instrumentations or devices and the 

need for further surgery. 


*  Medical risks of the planned spine surgery include but are not limited to

  generalized Infections to the whole body or local areas outside of the 

  surgical site (sepsis), heart attack, bleeding, anaphylaxis, meningitis, 

  seizure, epilepsy, hearing loss, burn marks, laceration of the head or other 

  areas of the body, bruising, hypersensitivity of the skin, bladder over 

  distension; allergic reaction; shoulder injury related to positioning; fat, 

  blood and air clots to other areas of the body like heart, lungs, brain; 

  failure of internal organs such as lungs, kidneys, liver and excessive 

  bleeding. If blood transfusions are necessary, note that transfusions may 

  cause intolerance reactions such as anaphylaxis or other complex reactions. 


Despite best efforts, the results of spine surgery might not heal in terms of 

bone, soft tissues such as skin, fascia, ligaments, and joints. Additionally, 

in order to achieve best possible results, spine surgery may be carried out 

beyond the initially planned levels and involve decompression, fusion including 

insertion of hardware at levels other than the original intended area of 

surgical interest change some portions of the procedure in order to ensure the 

best possible outcomes. 


With spine surgery and spinal fusion, there are different off label uses of 

instrumentation (devices, implants and hardware) as well as biological 

substances (bone morphogenic proteins, demineralized bone matrix) as well as 

using extra bone from allograft sources (i.e. cadaver bone) or autograft (iliac 

crest bone, ribs, or the spine itself). The patient has been given information 

about these practices and their inherent risks and benefits. 


[facility] is an educational center that serves as a training facility 

for neurosurgical and orthopedic CRNA and Nursing students. Physician assistants

are medically trained surgical providers who function in the outpatient, 

inpatient, and operating room setting under the direct supervision of the 

attending surgeon. 


[facility] has multiple operating rooms with single and overlapping 

rooms running daily. They currently function under the required guidelines as 

produced by the [facility] Finance Committee with regards to the overlapping rooms 

and will continue to comply with changes to this policy as they occur. The 

requirements include and are complied with as follows: (1) the critical portions

of the overlapping rooms will not occur at the same time, (2) the attending 

physician will be physically present during the critical portions of the 

procedure and immediately available during the entire case, and (3) a back-up 

attending is designated should the primary attending not be immediately 

available. 





The patient has had a chance to review all the listed information, has been 

given print outs detailing this information, and has had all his/her questions 

answered to their satisfaction.


It was my pleasure to have seen and examined Mr. Junior. In our visit today we 

have had a chance to go over my understanding of our patient's current 

condition, the natural course history without intervention and various 

interventional options. Questions were invited and answered, and the patient 

wishes to proceed as outlined above. I have seen and examined the patient for 25

minutes and we have spent more than 50% of the time in repeat and detailed 

counseling about the patient's condition, its natural course history with out 

and as much as can be predicted with surgery and re-review of various surgical 

treatment options.


In conclusion, Mr. Junior and his son in the room with him  requested we proceed

with the above suggested surgery and are willing to accept risks and limitations

of the suggested surgery as nature of the disease process and our best attempts 

at treatment for the condition.








Thank you again for allowing us to be part of your patient's care. Please don't 

hesitate to contact me if you have any further questions. 





 





Follow-up:

 


2 weeks PO





DEL F/U... 1month 6wks 3 months 6 months 1 year 





Patient Education: (Informational booklet, instructions, etc) given at today's 

appointment: DEL Yes


.ED:Patient Education: Y 





Plan at next visit: DEL xip xop X-ray oop





Medications Reviewed: YES





In our visit today Mr. Junior and I have had a chance to go over my 

understanding of the patient's current condition, the natural course history 

without intervention and various interventional options. Questions were invited 

and answered, and the patient wishes to proceed as outlined above. 





I will be sure to keep you updated afterMrLoc Junior returns here for further 

follow-up. Thank you again for your referral. Please do not hesitate to contact 

me if you have any further questions. 


  


Signed and authenticated by: 





[facility] 


Complex and Minimally Invasive Spine Surgery 


[address] [place] [address] 


[phone] 


Fax: [phone] 





This message is confidential, intended only for the named recipient(s) and may 

contain information that is privileged or exempt from disclosure under 

applicable law. If you are not the intended recipient(s), you are notified that

the dissemination, distribution or copying of this information is strictly 

prohibited. If you received this message in error, please notify the sender 

then delete this message. 





SCRIBE SIGN 





CC: DEL  REFDR... 


























Past Medical History


Past Medical History: Coronary Artery Disease (CAD)


Additional Past Medical History / Comment(s): Status post fall about 4 weeks ago

with new back pain


History of Any Multi-Drug Resistant Organisms: None Reported


Past Surgical History: Coronary Bypass/CABG, Heart Catheterization With Stent, 

Pacemaker


Additional Past Surgical History / Comment(s): heart valve replacemnt


Date of Last Stent Placement:: n/a


Type of Cardiac Device: Permanent Pacemaker


Device Placement Date:: n/a


Past Psychological History: No Psychological Hx Reported


Past Alcohol Use History: Rare


Past Drug Use History: None Reported





Medications and Allergies


                                Home Medications











 Medication  Instructions  Recorded  Confirmed  Type


 


Acetaminophen Tab [Tylenol] 650 mg PO Q4H PRN 23 History


 


Amiodarone [Cordarone] 200 mg PO DAILY 23 History


 


Ascorbic Acid [Vitamin C] 500 mg PO DAILY 23 History


 


Aspirin EC [Ecotrin Low Dose] 81 mg PO DAILY 23 History


 


Atorvastatin [Lipitor] 80 mg PO DAILY 23 History


 


Budesonide [Pulmicort Flexhaler] 2 puff INHALATION RT-BID 23 

History


 


Cholecalciferol [Vitamin D3 (25 25 mcg PO DAILY 23 History





Mcg = 1000 Iu)]    


 


Furosemide [Lasix] 60 mg PO AC-[id number] 23 History


 


Isosorbide Mononitrate ER [Imdur] 30 mg PO DAILY 23 History


 


Levalbuterol Hfa Inhaler [Xopenex 2 puff INHALATION RT-Q8H PRN 23

 History





Hfa Inhaler]    


 


Levothyroxine Sodium [Synthroid] 50 mcg PO DAILY 23 History


 


Loratadine [Claritin] 10 mg PO DAILY 23 History


 


Metoprolol Tartrate [Lopressor] 12.5 mg PO BID 23 History


 


Multivit-Mins/Iron/Folic/Lycop 1 tab PO DAILY 23 History





[Centrum Men's Tablet]    


 


Nitroglycerin Sl Tabs [Nitrostat] 0.4 mg SUBLINGUAL Q5M PRN 23 

History


 


Pantoprazole Sodium [Protonix] 40 mg PO DAILY 23 History


 


Potassium Chloride ER [K-Dur 20] 20 meq PO DAILY 23 History


 


Ranolazine [Ranexa] 500 mg PO BID 23 History


 


Sennosides/Docusate Sodium 1 tab PO BID 23 History





[Senna-S 8.6-50 mg Tablet]    


 


Tamsulosin HCl [Flomax] 0.4 mg PO DAILY 23 History


 


Meloxicam [Mobic] 15 mg PO DAILY PRN #30 tab 23  Rx


 


traMADol HCl [Ultram] 50 mg PO Q6HR PRN 3 Days #12 tab 23  Rx








                                    Allergies











Allergy/AdvReac Type Severity Reaction Status Date / Time


 


No Known Allergies Allergy   Verified 23 19:49














Physical Examination


Osteopathic Statement: *.  No significant issues noted on an osteopathic 

structural exam other than those noted in the History and Physical/Consult.

## 2023-08-19 LAB
BASOPHILS # BLD AUTO: 0.05 X 10*3/UL (ref 0–0.1)
BASOPHILS NFR BLD AUTO: 0.8 %
EOSINOPHIL # BLD AUTO: 0.06 X 10*3/UL (ref 0.04–0.35)
EOSINOPHIL NFR BLD AUTO: 0.9 %
ERYTHROCYTE [DISTWIDTH] IN BLOOD BY AUTOMATED COUNT: 4.6 X 10*6/UL (ref 4.4–5.6)
ERYTHROCYTE [DISTWIDTH] IN BLOOD: 14.5 % (ref 11.5–14.5)
HCT VFR BLD AUTO: 43.5 % (ref 39.6–50)
HGB BLD-MCNC: 14.9 D/DL (ref 13–17)
IMM GRANULOCYTES BLD QL AUTO: 0.5 %
LYMPHOCYTES # SPEC AUTO: 0.97 X 10*3/UL (ref 0.9–5)
LYMPHOCYTES NFR SPEC AUTO: 14.9 %
MCH RBC QN AUTO: 32.4 PG (ref 27–32)
MCHC RBC AUTO-ENTMCNC: 34.3 D/DL (ref 32–37)
MCV RBC AUTO: 94.6 FL (ref 80–97)
MONOCYTES # BLD AUTO: 0.74 X 10*3/UL (ref 0.2–1)
MONOCYTES NFR BLD AUTO: 11.4 %
NEUTROPHILS # BLD AUTO: 4.66 X 10*3/UL (ref 1.8–7.7)
NEUTROPHILS NFR BLD AUTO: 71.5 %
NRBC BLD AUTO-RTO: 0 X 10*3/UL (ref 0–0.01)
PLATELET # BLD AUTO: 197 X 10*3/UL (ref 140–440)
WBC # BLD AUTO: 6.51 X 10*3/UL (ref 4.5–10)

## 2023-08-22 ENCOUNTER — HOSPITAL ENCOUNTER (OUTPATIENT)
Dept: HOSPITAL 47 - OR | Age: 88
LOS: 1 days | Discharge: HOME | End: 2023-08-23
Attending: ORTHOPAEDIC SURGERY
Payer: COMMERCIAL

## 2023-08-22 VITALS — RESPIRATION RATE: 18 BRPM

## 2023-08-22 VITALS — BODY MASS INDEX: 25.7 KG/M2

## 2023-08-22 DIAGNOSIS — E07.9: ICD-10-CM

## 2023-08-22 DIAGNOSIS — Z95.0: ICD-10-CM

## 2023-08-22 DIAGNOSIS — Z79.51: ICD-10-CM

## 2023-08-22 DIAGNOSIS — Z95.2: ICD-10-CM

## 2023-08-22 DIAGNOSIS — E78.5: ICD-10-CM

## 2023-08-22 DIAGNOSIS — I11.0: ICD-10-CM

## 2023-08-22 DIAGNOSIS — S32.050A: Primary | ICD-10-CM

## 2023-08-22 DIAGNOSIS — Z86.59: ICD-10-CM

## 2023-08-22 DIAGNOSIS — G89.29: ICD-10-CM

## 2023-08-22 DIAGNOSIS — Z79.82: ICD-10-CM
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
## 2023-08-22 DIAGNOSIS — Z79.890: ICD-10-CM

## 2023-08-22 DIAGNOSIS — I25.10: ICD-10-CM

## 2023-08-22 DIAGNOSIS — Z87.891: ICD-10-CM

## 2023-08-22 LAB
GLUCOSE BLD-MCNC: 110 MG/DL (ref 70–110)
GLUCOSE BLD-MCNC: 97 MG/DL (ref 70–110)

## 2023-08-22 PROCEDURE — 80053 COMPREHEN METABOLIC PANEL: CPT

## 2023-08-22 PROCEDURE — 20225 BONE BIOPSY TROCAR/NDL DEEP: CPT

## 2023-08-22 PROCEDURE — 94760 N-INVAS EAR/PLS OXIMETRY 1: CPT

## 2023-08-22 PROCEDURE — 84132 ASSAY OF SERUM POTASSIUM: CPT

## 2023-08-22 PROCEDURE — 22514 PERQ VERTEBRAL AUGMENTATION: CPT

## 2023-08-22 PROCEDURE — 85025 COMPLETE CBC W/AUTO DIFF WBC: CPT

## 2023-08-22 PROCEDURE — 72100 X-RAY EXAM L-S SPINE 2/3 VWS: CPT

## 2023-08-22 NOTE — P.OP
Date of Procedure: 23


Preoperative Diagnosis: 


1. 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





2. LOW BACK PAIN





3. COMPLEX MEDICAL PATIENT








Postoperative Diagnosis: 


1. 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





2. LOW BACK PAIN





3. COMPLEX MEDICAL PATIENT








Procedure(s) Performed: 


1. L5 KYPHOPLASTY WITH BIOPSY BILATERAL








Implants: 


MARIA TERESA








Anesthesia: local, other (sedation)


Surgeon: Zack Kimball


Assistant #1: Jose De Leon (LOYDA Messina Was present and assisted with 

all aspects of the case from positioning to dressing placement)


Estimated Blood Loss (ml): 2


IV fluids (ml): 500


Urine output (ml): 0


Pathology: other (L5)


Condition: stable


Disposition: PACU


Indications for Procedure: 


Mr. Junior is presenting for evaluation of low back pain, continusous severe, 

progressive with debility. It was my pleasure to have seen and examined Mr. Junior. 


In our visit today we have had a chance to go over subjective complaints, 

physical examination findings and treatments including the natural course 

history without intervention and various interventional options. The patients 

imaging demonstrates: L5 VCF with 50% collapase and progressive deformity. 


On physical exam, Mr. Junior demonstrates: severe low back pain, TTP around L5 

midline and debility due to fracture and pain. 


I have explained to the patient that as their condition progresses it will cause

further neurological deficits and eventual paralysis. Based on the patients 

imaging, physical exam, and the rapid progression and disabling nature of their 

symptoms, at this time I recommend surgery in the form of a: L5 kyphoplasty with

biopsy BACKSURGT.... I discussed the risk and benefits of this procedure at 

length with Mr. Junior. The patient son agreed to considered pursuing the 

procedure abovementioned. Prior to surgery, she should follow up with her PCP 

(Cardio, ID, IM etc) for clearance. Questions were invited and answered, and the

patient wishes to proceed as outlined below. 


Currently, I am recommendin.L5  Kyphoplasty with biopsy








Description of Procedure: 


kYPHOPLASTY L5





The patient was seen and examined in the preoperative area.  All preoperative 

protocols were followed.  Informed consent was obtained, risks and benefits of 

the procedure were discussed at length.  Risks including bleeding infection 

damage to the surrounding tissue and risk of re-operation were discussed with 

the patient.  Risk of anesthesia up to and including death was discussed with 

the patient.  These are outlined in the risk review.  They were willing to 

accept these risks and all the risks of surgery.  The patient was given a 

weight-based dose of antibiotics in the form of 2 g Ancef.  The patient was seen

and evaluated by the anesthesia team who deemed them fit for surgery. The site 

was marked, the patient was willing to proceed with the procedure.





The patient was transferred to the operative suite by the Department of 

anesthesia.  They were then drifted off to sleep by the department anesthesia 

and SEDATION was performed.  The patient tolerated this well.  Once confirmation

of lines and ventilation the patient was transferred to a prone Dwight table 

very carefully.  All bony prominences including wrists, elbows, axilla, chest, 

hips, and thighs, and feet were padded very well.  Special attention was paid to

the genitalia, and these were padded accordingly. SCDs were placed on bilateral 

lower extremities and were connected.  Arms were well padded and placed on arm 

boards up and out in the 90/90 position.  Once in position, again we confirmed 

good ventilation capabilities and that lines were running appropriately.  The 

patients Lumbar spine was then exposed.  1010s were placed outlining the 

incision site. Standard alcohol was used to clean the incision site and allowed 

to dry. C-arm was used to needle localize the pedicles at T12 and bio-levi the 

patient and confirm level for incision which was marked with a skin marker.  

Operative briefing was performed with all teams and everyone in agreement to 

proceed. The patient was then prepped and draped in a normal sterile fashion.  

Timeout was then performed, and all parties agreed with the procedure to be 

performed. 





Local anesthetic was given in the skin and soft tissue over the incision to be 

made b/l at L5 localized with fluoroscopy.  Skin nick was made. Jamshitdis was 

passed into the L5 vertebral body via the pedicle bilaterally. This was done 

with biplane fluoroscopy. Once in good position in the body the trochar removed.

Biopsy needle was passed into the body and biopsy taken. Drill was then passed 

and biopsy material taken from drill as well. Curette used to make space and 

reduce the fracture. Balloon was then passed an inflated which showed reduction 

of endplate on AP and Lateral and confirmed central placement. Cement was then 

placed bilaterally under flouroscopic guidance. Good fill of cement was seen 

without extravasation. Pt remained stable through process. Once good fill, the 

Jamshedi was removed and the wound irrigated. AP and lateral confirmed good 

reduction of the fracture and good cement fill. The skin was closed with a 

simple stitch and dressed with glue and a bandaid.





The patient was then transferred off the table back to their hospital bed a-

traumatically.  They were extubated by the department of anesthesia.  They were 

then transferred to PACU in stable condition having tolerated the procedure with

no complications.

## 2023-08-22 NOTE — P.PN
Progress Note - Text


Progress Note Date: 08/22/23





Pt s/e in Pre op area with son at bedside as well as anesthesiologist. 

Cardiology contacted yesterday and pt is high risk for any procedure.  Discussed

possibilities including sedation.  Anesthesia however needs to have possibility 

of general if needed should the patient warrant this.  Discussed with son and pt

at bedside extensively.  Explained to them the risks and benefits of sedation vs

general and the spectrum of medication used to achieve this.  Discussed with 

them again the possibility of general and if needed the son might have to endure

the possibility that if general is needed the ET tube might not be able to come 

out.  Should this happen he would have to make a decision and he understands.  

The pt also understands as well and pt states he would rather have that be the 

case than life in constant pain.  They understand the risks of this procedure 

and possible outcomes and they are willing to assume all the risk of surgery at 

this time.  They understand the patients high risk for any procedure, but they 

still want it done.  They are willing to proceed with surgery.  Discussed with 

anesthesiologist who agrees.

## 2023-08-22 NOTE — XR
Intraoperative/procedural fluoroscopic services were provided for L5 kyphoplasty. Total fluoroscopy t
madan is 1:26 minutes with a total of 3 submitted images to PACS. Total DAP 8.3903 Gycm2.  Please see t
he operative note for further details.

## 2023-08-23 VITALS — TEMPERATURE: 98.3 F | HEART RATE: 61 BPM | SYSTOLIC BLOOD PRESSURE: 106 MMHG | DIASTOLIC BLOOD PRESSURE: 62 MMHG

## 2023-08-23 LAB
ALBUMIN SERPL-MCNC: 3.2 D/DL (ref 3.8–4.9)
ALBUMIN/GLOB SERPL: 1.39 RATIO (ref 1.6–3.17)
ALP SERPL-CCNC: 90 U/L (ref 41–126)
ALT SERPL-CCNC: 76 U/L (ref 10–49)
ANION GAP SERPL CALC-SCNC: 8.8 MMOL/L (ref 4–12)
AST SERPL-CCNC: 85 U/L (ref 14–35)
BASOPHILS # BLD AUTO: 0.05 X 10*3/UL (ref 0–0.1)
BASOPHILS NFR BLD AUTO: 0.9 %
BUN SERPL-SCNC: 21.6 MG/DL (ref 9–27)
BUN/CREAT SERPL: 21.6 RATIO (ref 12–20)
CALCIUM SPEC-MCNC: 8.5 MG/DL (ref 8.7–10.3)
CHLORIDE SERPL-SCNC: 97 MMOL/L (ref 96–109)
CO2 SERPL-SCNC: 31.2 MMOL/L (ref 21.6–31.8)
EOSINOPHIL # BLD AUTO: 0.18 X 10*3/UL (ref 0.04–0.35)
EOSINOPHIL NFR BLD AUTO: 3.1 %
ERYTHROCYTE [DISTWIDTH] IN BLOOD BY AUTOMATED COUNT: 3.88 X 10*6/UL (ref 4.4–5.6)
ERYTHROCYTE [DISTWIDTH] IN BLOOD: 14.7 % (ref 11.5–14.5)
GLOBULIN SER CALC-MCNC: 2.3 D/DL (ref 1.6–3.3)
GLUCOSE BLD-MCNC: 138 MG/DL (ref 70–110)
GLUCOSE SERPL-MCNC: 79 MG/DL (ref 70–110)
HCT VFR BLD AUTO: 37.5 % (ref 39.6–50)
HGB BLD-MCNC: 12.6 D/DL (ref 13–17)
IMM GRANULOCYTES BLD QL AUTO: 0.3 %
LYMPHOCYTES # SPEC AUTO: 0.59 X 10*3/UL (ref 0.9–5)
LYMPHOCYTES NFR SPEC AUTO: 10.1 %
MCH RBC QN AUTO: 32.5 PG (ref 27–32)
MCHC RBC AUTO-ENTMCNC: 33.6 D/DL (ref 32–37)
MCV RBC AUTO: 96.6 FL (ref 80–97)
MONOCYTES # BLD AUTO: 0.66 X 10*3/UL (ref 0.2–1)
MONOCYTES NFR BLD AUTO: 11.2 %
NEUTROPHILS # BLD AUTO: 4.37 X 10*3/UL (ref 1.8–7.7)
NEUTROPHILS NFR BLD AUTO: 74.4 %
NRBC BLD AUTO-RTO: 0 X 10*3/UL (ref 0–0.01)
PLATELET # BLD AUTO: 172 X 10*3/UL (ref 140–440)
POTASSIUM SERPL-SCNC: 3.2 MMOL/L (ref 3.5–5.5)
PROT SERPL-MCNC: 5.5 D/DL (ref 6.2–8.2)
SODIUM SERPL-SCNC: 137 MMOL/L (ref 135–145)
WBC # BLD AUTO: 5.87 X 10*3/UL (ref 4.5–10)

## 2023-08-23 NOTE — P.DS
Providers


Date of admission: 





08/22/23


Expected date of discharge: 08/23/23


Attending physician: 


Zack Kimball DO





Consults: 





                                        





08/22/23 20:08


Consult Physician Routine 


   Consulting Provider: Elena Vicente


   Consult Reason/Comments: Medical Management


   Do you want consulting provider notified?: Yes











Primary care physician: 


[facility] Course: 





Hospital Course:





The patient was evaluated preoperatively and found to have the diagnosis of L5 

compression fracture. They underwent appropriate preoperative care and were 

willing to undergo the intended procedure. They underwent a successful L5 

kyphoplasty, were recovered appropriately and sent to the floor. While on the 

floor they worked with physical therapy, occupational therapy and nursing to 

enhance their recovery experience. Their pain was well controlled through their 

stay and they were started on appropriate medications, DVT ppx modalities, 

activity and dietary needs. Daily labs were monitored closely, and transfusions 

were only used when necessary. Medicine as well as other consulting services 

have made their input and have helped with our team approach and mul

tidisciplinary care. PT milestones have been met and passed and they have made 

the recommendation of home for this patient and treating providers agree with 

this care path. The patient will be discharged home with appropriate 

medications, instructions and follow-up information and in stable condition.


Patient Condition at Discharge: Good





Plan - Discharge Summary


Discharge Rx Participant: Yes


New Discharge Prescriptions: 


New


   traMADol HCl [Ultram] 50 mg PO Q6H PRN #12 tab


     PRN Reason: Pain





No Action


   Aspirin EC [Ecotrin Low Dose] 81 mg PO DAILY


   Metoprolol Tartrate [Lopressor] 12.5 mg PO BID


   Pantoprazole Sodium [Protonix] 40 mg PO DAILY


   Furosemide [Lasix] 80 mg PO DAILY


   Albuterol Inhaler [Ventolin Hfa Inhaler] 1 - 2 puff INHALATION Q4-6H PRN


     PRN Reason: Shortness Of Breath


   metOLazone [Zaroxolyn] 2.5 mg PO BID


   Vitamin D3 (Unknown Dose) 1 dose PO DAILY


   Multivitamins, Thera [Multivitamin (formulary)] 1 tab PO DAILY


   Tamsulosin [Flomax] 0.4 mg PO DAILY


   Amiodarone [Cordarone] 200 mg PO BID


   Cholecalciferol [Vitamin D3 (25 Mcg = 1000 Iu)] 50 mcg PO DAILY


   Ascorbic Acid [Vitamin C] 500 mg PO DAILY


   traZODone  mg PO HS


   Naproxen [Naprosyn] 375 mg PO Q12HR


   Ranolazine [Ranolazine ER] 500 mg PO Q12HR


   Lidocaine 5% Patch [Lidoderm] 1 patch TOPICAL DAILY


   Docusate [Colace] 100 mg PO BID


   Empagliflozin [Jardiance] 10 mg PO DAILY


   guaiFENesin [Mucinex] 600 mg PO Q12H


   Isosorbide Mononitrate ER [Imdur] 30 mg PO DAILY


   Levothyroxine Sodium [Synthroid] 50 mcg PO DAILY


   Loratadine [Claritin] 10 mg PO DAILY


   Nitroglycerin Sl Tabs [Nitrostat] 0.4 mg SUBLINGUAL Q5M PRN


     PRN Reason: Chest Pain


   Potassium Chloride 10 meq PO BID


   Magnesium Oxide [Mag-Ox] 420 mg PO DAILY


   Fluticasone Nasal Spray [Flonase Nasal Spray] 2 spray EA NOSTRIL DAILY


   Mometasone 200mcg 2 puff INHALATION BID


   Atorvastatin [Lipitor] 80 mg PO DAILY


Discharge Medication List





Aspirin EC [Ecotrin Low Dose] 81 mg PO DAILY 06/16/23 [History]


Isosorbide Mononitrate ER [Imdur] 30 mg PO DAILY 06/16/23 [History]


Levothyroxine Sodium [Synthroid] 50 mcg PO DAILY 06/16/23 [History]


Loratadine [Claritin] 10 mg PO DAILY 06/16/23 [History]


Metoprolol Tartrate [Lopressor] 12.5 mg PO BID 06/16/23 [History]


Nitroglycerin Sl Tabs [Nitrostat] 0.4 mg SUBLINGUAL Q5M PRN 06/16/23 [History]


Pantoprazole Sodium [Protonix] 40 mg PO DAILY 06/16/23 [History]


Albuterol Inhaler [Ventolin Hfa Inhaler] 1 - 2 puff INHALATION Q4-6H PRN 

08/21/23 [History]


Amiodarone [Cordarone] 200 mg PO BID 08/21/23 [History]


Ascorbic Acid [Vitamin C] 500 mg PO DAILY 08/21/23 [History]


Atorvastatin [Lipitor] 80 mg PO DAILY 08/21/23 [History]


Cholecalciferol [Vitamin D3 (25 Mcg = 1000 Iu)] 50 mcg PO DAILY 08/21/23 

[History]


Docusate [Colace] 100 mg PO BID 08/21/23 [History]


Empagliflozin [Jardiance] 10 mg PO DAILY 08/21/23 [History]


Fluticasone Nasal Spray [Flonase Nasal Spray] 2 spray EA NOSTRIL DAILY 08/21/23 

[History]


Furosemide [Lasix] 80 mg PO DAILY 08/21/23 [History]


Lidocaine 5% Patch [Lidoderm] 1 patch TOPICAL DAILY 08/21/23 [History]


Magnesium Oxide [Mag-Ox] 420 mg PO DAILY 08/21/23 [History]


Mometasone 200mcg 2 puff INHALATION BID 08/21/23 [History]


Multivitamins, Thera [Multivitamin (formulary)] 1 tab PO DAILY 08/21/23 

[History]


Naproxen [Naprosyn] 375 mg PO Q12HR 08/21/23 [History]


Potassium Chloride 10 meq PO BID 08/21/23 [History]


Ranolazine [Ranolazine ER] 500 mg PO Q12HR 08/21/23 [History]


Tamsulosin [Flomax] 0.4 mg PO DAILY 08/21/23 [History]


Vitamin D3 (Unknown Dose) 1 dose PO DAILY 08/21/23 [History]


guaiFENesin [Mucinex] 600 mg PO Q12H 08/21/23 [History]


metOLazone [Zaroxolyn] 2.5 mg PO BID 08/21/23 [History]


traZODone  mg PO HS 08/21/23 [History]


traMADol HCl [Ultram] 50 mg PO Q6H PRN #12 tab 08/22/23 [Rx]








Follow up Appointment(s)/Referral(s): 


Zack Kimball DO [Doctor of Osteopathic Medicine] - 09/06/23 1:30 pm


Patient Instructions/Handouts:  *Surgery [facility] - (Anesthesia) Discharge 

Instructions Outpatient Surgery, Kyphoplasty (DC)


Activity/Diet/Wound Care/Special Instructions: 


Orthopedic Discharge Instructions:


1. Resume home medications after discharge


2. Weightbear as tolerated


3. Utilize walker/cane as needed


4. No lifting, bending, twisting


5. Pain medication as needed


6. Follow up at [facility] in 3 weeks, call office with any question


Discharge Disposition: HOME SELF-CARE

## 2023-08-23 NOTE — P.PN
Subjective


Progress Note Date: 08/23/23





Hospital course:





Patient is a very pleasant 88-year-old male with a past medical history of CAD 

status post CABG and permanent pacemaker placement, hypertension, 

hyperlipidemia, hypothyroidism, chronic heart failure, and type II 

non-insulin-dependent diabetes mellitus.  He is currently admitted to orthopedic

surgery team status post elective L5 kyphoplasty with biopsy.  Surgical pro

cedure was completed by Dr. Kimball on 8/22/23.  We were consulted for 

medical management throughout hospitalization.





Physical exam:





Patient seen and fully evaluated at bedside this morning.  Patient appears to be

doing well.  Denies having any complaints or concerns at this time.  States mild

2-3 out of 10 lower back pain otherwise denies any complaints.





Vital signs reviewed and stable. 


General: Nontoxic, no distress and appears stated age.  


Derm: Skin warm and dry, normal coloration for ethnicity.


Head: Atraumatic, normocephalic and symmetric.  


Eyes: EOMs intact, no lid lag, and anicteric sclera


Mouth: no lip lesions, mucus membranes moist


Cardiovascular: regular rate and rhythm with normal S1S2, no murmur, positive 

posterior tibial pulses bilaterally, and cap refill < 2 seconds.  


Lungs: Respirations even, regular, and unlabored on room air. Lungs CTA 

bilaterally, no rhonchi, no rales, no wheezing, and no accessory muscle usage. 


Abdominal: soft, nontender to palpation, no guarding, no appreciable 

organomegaly


Ext: ROM intact. No gross muscle atrophy, no edema, no contractures


Neuro: Speech clear, face symmetrical and CN II-XII grossly intact with no noted

focal neuro deficits


Psych: Alert and oriented to person, place, time, and situation. Appropriate and

pleasant affect. 





Assessment and Plan of Care:





Acute postoperative blood loss anemia, expected finding and stable


-CBC showing stable postoperative blood loss anemia with hemoglobin of 12.6 with

preoperative hemoglobin of 14.9.  


-Hemoglobin stable at this time no need for further interventions or testing at 

this time,no active signs of bleeding.


 


Status post L5 kyphoplasty with biopsy


CAD status post CABG and permanent pacemaker placement


Hypertension


Hyperlipidemia


Hypothyroidism


Chronic heart failure


-Patient to continue daily medication regimen with aspirin 81 mg daily, 

isosorbide mononitrate 30 mg daily, metoprolol 12.5 mg twice daily, and 

amiodarone 200 mg twice daily





Type II non-insulin-dependent diabetes mellitus.


-Hold Jardiance and place patient on glycemic protocol with NovoLog sliding 

scale to maintain tight glycemic control throughout hospitalization.





Data review:


Postoperative labs reviewed.  CBC showing stable postoperative blood loss 

anemia with hemoglobin of 12.6 with preoperative hemoglobin of 14.9.  BMP 

pending.


Vital signs reviewed and stable.  Blood pressure 106/62, heart rate 61, 

respiratory rate 18, temperature 90.3F, SpO2 of 97% on room air.





Thank you for allowing us to participate in the care of this pleasant patient.  

Do not hesitate to contact us with questions.  Someone can be reached from the 

[facility] hospitalist group all hours of the day at [phone] or via 

[phone].


Patient was seen independently by Nurse Pracitioner.


This document was prepared using Dragon dictation software.  Please allow for 

errors in transcription, while rare they do occur.





Objective





- Vital Signs


Vital signs: 


                                   Vital Signs











Temp  97.7 F   08/23/23 00:05


 


Pulse  58 L  08/23/23 00:05


 


Resp  18   08/23/23 00:05


 


BP  103/61   08/23/23 00:05


 


Pulse Ox  97   08/23/23 07:59


 


FiO2      








                                 Intake & Output











 08/22/23 08/23/23 08/23/23





 18:59 06:59 18:59


 


Intake Total 1650  


 


Output Total 2  


 


Balance 1648  


 


Weight 87 kg 87 kg 


 


Intake:   


 


  IV 1650  


 


Output:   


 


  Estimated Blood Loss 2  


 


Other:   


 


  Voiding Method  Toilet 














- Labs


CBC & Chem 7: 


                                 08/23/23 05:51





                                 08/23/23 05:51


Labs: 


                  Abnormal Lab Results - Last 24 Hours (Table)











  08/23/23 Range/Units





  08:39 


 


POC Glucose (mg/dL)  138 H  ()  mg/dL

## 2023-08-23 NOTE — P.CONS
History of Present Illness





- Reason for Consult


Consult date: 08/22/23


perioperative medical management





- History of Present Illness





88 year old male with hypertension , CAD 





Patient is very hard of hearing and provides very limited history.  He came in 

for chronic low back pain for scheduled kyphoplasty he tolerated the chair well 

no observed immediate postoperative complications he currently denies any chest 

pain trouble breathing denies any new onset focal neuro deficits denies any uri

nary changes.





He tolerated by mouth intake denies any nausea vomiting or abdominal pain





review of systems


Pertinent positives as noted in HPI. All other systems were reviewed and are 

negative








on exam


Constitutional:          No acute distress, conversant, very hard of hearing 


Eyes:      Anicteric sclerae, moist conjunctiva, 


         Pupils equal round reactive to light





ENMT:      NC/AT


         Oropharynx clear, no erythema, or exudates





Neck:      Supple,  no masses, or JVD


         No carotid bruits


         No thyromegaly





Lungs:      Clear to auscultation


         Clear to percussion


         Normal respiratory effort, no accessory muscle use 





Cardiovascular:      Heart regular in rate and rhythm, 


         No murmurs, gallops, or rubs


         No peripheral edema





Abdominal:       Soft


         Nontender, no guarding, rebound or rigidity


         Abdomen moving with respiration


         Normoactive bowel sounds


         No hepatomegaly, No splenomegaly


         No palpable mass 


         No abdominal wall hernia noted 


 





Extremities:      No digital cyanosis 


         No clubbing


         Pedal pulses intact and symmetrical


         Radial pulses intact and symmetrical 


         No calf tenderness 





Psychiatric:      Alert and oriented to person, place  


Neuro      Muscles Strength 4/5 in all 4 extremities 


         Sensation to light touch grossly present throughout


         Cranial nerves II-XII grossly intact 


Lymphatics:       no palpable cervical or supraclavicular   lymph nodes  











Past Medical History


Past Medical History: Coronary Artery Disease (CAD), Chest Pain / Angina, Heart 

Failure, GERD/Reflux, Hyperlipidemia, Respiratory Disorder, Thyroid Disorder


Additional Past Medical History / Comment(s): hx of fall with lumbar compression

fx., son states mix up in his meds and he was taking double lasix and became 

dehydrated and had 2-3 more falls possibly from dehydration., constipation, 

pacemaker, states he is not diabetic (on Jardiance).


History of Any Multi-Drug Resistant Organisms: None Reported


Past Surgical History: Coronary Bypass/CABG, Heart Catheterization With Stent, 

Pacemaker


Additional Past Surgical History / Comment(s): heart valve replacement, cabg (5)

over 25 yrs ago, total of 3 cardiac stents (last stent approx. 4 yrs ago).


Past Anesthesia/Blood Transfusion Reactions: No Reported Reaction


Date of Last Stent Placement:: 2019?


Type of Cardiac Device: Permanent Pacemaker


Device Placement Date:: unknown


Past Psychological History: No Psychological Hx Reported


Smoking Status: Former smoker, Unknown if ever smoked


Past Alcohol Use History: None Reported


Additional Past Alcohol Use History / Comment(s): quit smoking 50 years ago.


Past Drug Use History: None Reported





Medications and Allergies


                                Home Medications











 Medication  Instructions  Recorded  Confirmed  Type


 


Aspirin EC [Ecotrin Low Dose] 81 mg PO DAILY 06/16/23 08/22/23 History


 


Isosorbide Mononitrate ER [Imdur] 30 mg PO DAILY 06/16/23 08/22/23 History


 


Levothyroxine Sodium [Synthroid] 50 mcg PO DAILY 06/16/23 08/22/23 History


 


Loratadine [Claritin] 10 mg PO DAILY 06/16/23 08/22/23 History


 


Metoprolol Tartrate [Lopressor] 12.5 mg PO BID 06/16/23 08/22/23 History


 


Nitroglycerin Sl Tabs [Nitrostat] 0.4 mg SUBLINGUAL Q5M PRN 06/16/23 08/22/23 

History


 


Pantoprazole Sodium [Protonix] 40 mg PO DAILY 06/16/23 08/22/23 History


 


Albuterol Inhaler [Ventolin Hfa 1 - 2 puff INHALATION Q4-6H PRN 08/21/23 08/22/23 History





Inhaler]    


 


Amiodarone [Cordarone] 200 mg PO BID 08/21/23 08/22/23 History


 


Ascorbic Acid [Vitamin C] 500 mg PO DAILY 08/21/23 08/22/23 History


 


Atorvastatin [Lipitor] 80 mg PO DAILY 08/21/23 08/22/23 History


 


Cholecalciferol [Vitamin D3 (25 50 mcg PO DAILY 08/21/23 08/22/23 History





Mcg = 1000 Iu)]    


 


Docusate [Colace] 100 mg PO BID 08/21/23 08/22/23 History


 


Empagliflozin [Jardiance] 10 mg PO DAILY 08/21/23 08/22/23 History


 


Fluticasone Nasal Spray [Flonase 2 spray EA NOSTRIL DAILY 08/21/23 08/22/23 

History





Nasal Spray]    


 


Furosemide [Lasix] 80 mg PO DAILY 08/21/23 08/22/23 History


 


Lidocaine 5% Patch [Lidoderm] 1 patch TOPICAL DAILY 08/21/23 08/22/23 History


 


Magnesium Oxide [Mag-Ox] 420 mg PO DAILY 08/21/23 08/22/23 History


 


Mometasone 200mcg 2 puff INHALATION BID 08/21/23  History


 


Multivitamins, Thera [Multivitamin 1 tab PO DAILY 08/21/23 08/22/23 History





(formulary)]    


 


Naproxen [Naprosyn] 375 mg PO Q12HR 08/21/23 08/22/23 History


 


Potassium Chloride 10 meq PO BID 08/21/23 08/22/23 History


 


Ranolazine [Ranolazine ER] 500 mg PO Q12HR 08/21/23 08/22/23 History


 


Tamsulosin [Flomax] 0.4 mg PO DAILY 08/21/23 08/22/23 History


 


Vitamin D3 (Unknown Dose) 1 dose PO DAILY 08/21/23 08/22/23 History


 


guaiFENesin [Mucinex] 600 mg PO Q12H 08/21/23 08/22/23 History


 


metOLazone [Zaroxolyn] 2.5 mg PO BID 08/21/23 08/22/23 History


 


traZODone  mg PO HS 08/21/23 08/22/23 History


 


traMADol HCl [Ultram] 50 mg PO Q6H PRN #12 tab 08/22/23  Rx








                                    Allergies











Allergy/AdvReac Type Severity Reaction Status Date / Time


 


No Known Allergies Allergy   Verified 08/22/23 13:51














Physical Exam


Vitals: 


                                   Vital Signs











  Temp Pulse Resp BP Pulse Ox


 


 08/23/23 00:05  97.7 F  58 L  18  103/61  97


 


 08/22/23 19:05  98.0 F  61  18  143/72  97


 


 08/22/23 18:30   60  16  113/62  100


 


 08/22/23 18:15   61  16  122/61  100


 


 08/22/23 18:00   59 L  16  119/64  100


 


 08/22/23 17:45   60  16  104/51  99


 


 08/22/23 17:30   61  16  118/55  97


 


 08/22/23 17:15   60  16  91/43  99


 


 08/22/23 17:00   60  16  119/54  100


 


 08/22/23 16:45   60  17  115/58  100


 


 08/22/23 16:33   60  18  104/50  100


 


 08/22/23 16:18   63  12  104/62  100


 


 08/22/23 16:03   59 L  22  95/45  94 L


 


 08/22/23 15:48  97.4 F L  80  20  113/55  96


 


 08/22/23 14:03  96.7 F L  61  20  135/63  97








                                Intake and Output











 08/22/23 08/22/23 08/23/23





 14:59 22:59 06:59


 


Intake Total  1650 


 


Output Total  2 


 


Balance  1648 


 


Intake:   


 


  IV  1650 


 


Output:   


 


  Estimated Blood Loss  2 


 


Other:   


 


  Weight 87 kg  














Results


CBC & Chem 7: 


                                 08/22/23 13:42





Assessment and Plan


Assessment: 





Status post kyphoplasty postoperative day 0


Postoperative care per orthopedic team





Chronic conditions


Hypertension, continue with mid to upper lobe


Coronary artery disease, continue statin, Imdur, amiodarone


Hypothyroid continue with levothyroxine


Diabetes mellitus, insulin sliding scale





Check a.m. labs CBC and CMP





Stable from medical standpoint





Thank you for this consultation

## 2023-08-23 NOTE — P.PN
Subjective


Progress Note Date: 08/23/23


Principal diagnosis: 





1.  L5 vertebral compression fracture greater than 50% compressed


2.  Low back pain


3.  Complex medical patient








Patient seen and examined this morning.  Patient is resting comfortably in bed. 

Patient reports that his pain is managed on current regimen.  Patient was able 

to reposition himself to the side of the bed to sit at bedside table to eat 

breakfast.  Patient reports that he is ready to go home today.  No acute events 

overnight.





Objective





- Vital Signs


Vital signs: 


                                   Vital Signs











Temp  97.7 F   08/23/23 00:05


 


Pulse  58 L  08/23/23 00:05


 


Resp  18   08/23/23 00:05


 


BP  103/61   08/23/23 00:05


 


Pulse Ox  97   08/23/23 00:05


 


FiO2      








                                 Intake & Output











 08/22/23 08/23/23 08/23/23





 18:59 06:59 18:59


 


Intake Total 1650  


 


Output Total 2  


 


Balance 1648  


 


Weight 87 kg 87 kg 


 


Intake:   


 


  IV 1650  


 


Output:   


 


  Estimated Blood Loss 2  


 


Other:   


 


  Voiding Method  Toilet 














- Exam





Physical Examination





General:  The patient is awake and alert, in no acute distress





Skin:  Skin is warm and dry with no obvious rashes or lesions.  Surgical 

puncture site at the lumbar region, Band-Aid is present 





Eye: Pupils are equal, round and reactive to light, extra-ocular movements are i

ntact; there is normal conjunctiva bilaterally.  





Neck:  The neck is supple, there is no tenderness and ROM intact.





Cardiovascular:  There is a regular rate and rhythm. No murmur, rub or gallop is

appreciated.





Respiratory:  Lungs are clear to auscultation, respirations are non-labored, 

breath sounds are equal. 


 


Gastrointestinal:  Soft, non-distended, non-tender abdomen.





Back:  There is no tenderness to palpation in the midline, paralumbar, 

parathoracic or buttocks region. There is no obvious deformity


.


Musculoskeletal: ROM limited secondary to pain and stiffness from surgical 

procedure. Muscle strength in all major muscle groups of bilateral upper 

extremities 5/5, bilateral lower extremities 4/5.





Neurological:  CN 2-12 intact. There are no obvious motor or sensory deficits. 

Movement and coordination equal and intact. Sensory exam to light touch intact 

C5-T1 and intact from L2-S1. Reflexes 2/4 in bilateral upper and lower 

extremities. Negative Hoffmans, babinski, and clonus signs. 





Psychiatric:  Cooperative, appropriate mood & affect, normal judgment. 





- Labs


CBC & Chem 7: 


                                 08/22/23 13:42





Assessment and Plan


Assessment: 





Postop day 1: L5 kyphoplasty





1.  L5 vertebral compression fracture greater than 50% compressed


2.  Low back pain


3.  Complex medical patient


Plan: 








-Appreciate consultant and team management.  





-Activity: Ambulate QID, OOB all meals, up and about, limit lifting bending 

twisting to less than 5 lbs. Use walker or cane if needed for stability.  





-Pain control: Adequate at this time





-Meds: reviewed





-GI ppx: senna, Miralax





-DVT PPX: Mechanical





-Encourage IS 10x/hr





-Dispo: Discharge home today 08/23/2023








*I reviewed and discussed this case with my attending Dr. Kimball, whom has 

reviewed this chart and films and is in agreement with assessment and plan of 

care as outlined above.





I have personally seen and examined the patient, performed the documentation and

the assessment and plan as written.  





Number of minutes spent on the visit: 15m.

## 2023-11-21 ENCOUNTER — APPOINTMENT (OUTPATIENT)
Dept: URBAN - NONMETROPOLITAN AREA CLINIC 60 | Age: 88
Setting detail: DERMATOLOGY
End: 2023-11-21

## 2023-11-21 DIAGNOSIS — L57.8 OTHER SKIN CHANGES DUE TO CHRONIC EXPOSURE TO NONIONIZING RADIATION: ICD-10-CM

## 2023-11-21 DIAGNOSIS — L57.0 ACTINIC KERATOSIS: ICD-10-CM

## 2023-11-21 DIAGNOSIS — D22 MELANOCYTIC NEVI: ICD-10-CM

## 2023-11-21 DIAGNOSIS — L82.1 OTHER SEBORRHEIC KERATOSIS: ICD-10-CM

## 2023-11-21 DIAGNOSIS — L81.4 OTHER MELANIN HYPERPIGMENTATION: ICD-10-CM

## 2023-11-21 DIAGNOSIS — D18.0 HEMANGIOMA: ICD-10-CM

## 2023-11-21 DIAGNOSIS — D485 NEOPLASM OF UNCERTAIN BEHAVIOR OF SKIN: ICD-10-CM

## 2023-11-21 PROBLEM — C44.629 SQUAMOUS CELL CARCINOMA OF SKIN OF LEFT UPPER LIMB, INCLUDING SHOULDER: Status: ACTIVE | Noted: 2023-11-21

## 2023-11-21 PROBLEM — D18.01 HEMANGIOMA OF SKIN AND SUBCUTANEOUS TISSUE: Status: ACTIVE | Noted: 2023-11-21

## 2023-11-21 PROBLEM — D48.5 NEOPLASM OF UNCERTAIN BEHAVIOR OF SKIN: Status: ACTIVE | Noted: 2023-11-21

## 2023-11-21 PROBLEM — D22.5 MELANOCYTIC NEVI OF TRUNK: Status: ACTIVE | Noted: 2023-11-21

## 2023-11-21 PROCEDURE — OTHER SUNSCREEN RECOMMENDATIONS: OTHER

## 2023-11-21 PROCEDURE — OTHER MIPS QUALITY: OTHER

## 2023-11-21 PROCEDURE — 17000 DESTRUCT PREMALG LESION: CPT | Mod: 59

## 2023-11-21 PROCEDURE — OTHER CONSULTATION EXCISION: OTHER

## 2023-11-21 PROCEDURE — 99203 OFFICE O/P NEW LOW 30 MIN: CPT | Mod: 25

## 2023-11-21 PROCEDURE — OTHER LIQUID NITROGEN: OTHER

## 2023-11-21 PROCEDURE — 11102 TANGNTL BX SKIN SINGLE LES: CPT

## 2023-11-21 PROCEDURE — 17003 DESTRUCT PREMALG LES 2-14: CPT

## 2023-11-21 PROCEDURE — OTHER COUNSELING: OTHER

## 2023-11-21 PROCEDURE — OTHER BIOPSY BY SHAVE METHOD: OTHER

## 2023-11-21 PROCEDURE — OTHER PHOTO-DOCUMENTATION: OTHER

## 2023-11-21 ASSESSMENT — LOCATION SIMPLE DESCRIPTION DERM
LOCATION SIMPLE: LEFT UPPER BACK
LOCATION SIMPLE: LEFT UPPER ARM
LOCATION SIMPLE: RIGHT HAND
LOCATION SIMPLE: RIGHT UPPER BACK
LOCATION SIMPLE: LEFT CHEEK
LOCATION SIMPLE: RIGHT FOREARM

## 2023-11-21 ASSESSMENT — LOCATION DETAILED DESCRIPTION DERM
LOCATION DETAILED: RIGHT MEDIAL UPPER BACK
LOCATION DETAILED: LEFT INFERIOR CENTRAL MALAR CHEEK
LOCATION DETAILED: RIGHT PROXIMAL DORSAL FOREARM
LOCATION DETAILED: RIGHT RADIAL DORSAL HAND
LOCATION DETAILED: LEFT CENTRAL MALAR CHEEK
LOCATION DETAILED: LEFT SUPERIOR MEDIAL UPPER BACK
LOCATION DETAILED: LEFT DISTAL POSTERIOR UPPER ARM
LOCATION DETAILED: LEFT MEDIAL UPPER BACK

## 2023-11-21 ASSESSMENT — LOCATION ZONE DERM
LOCATION ZONE: FACE
LOCATION ZONE: HAND
LOCATION ZONE: ARM
LOCATION ZONE: TRUNK

## 2023-12-12 ENCOUNTER — APPOINTMENT (OUTPATIENT)
Dept: URBAN - NONMETROPOLITAN AREA CLINIC 60 | Age: 88
Setting detail: DERMATOLOGY
End: 2023-12-12

## 2023-12-12 PROBLEM — C44.629 SQUAMOUS CELL CARCINOMA OF SKIN OF LEFT UPPER LIMB, INCLUDING SHOULDER: Status: ACTIVE | Noted: 2023-12-12

## 2023-12-12 PROCEDURE — OTHER PRESCRIPTION: OTHER

## 2023-12-12 PROCEDURE — OTHER CURETTAGE AND DESTRUCTION: OTHER

## 2023-12-12 PROCEDURE — OTHER ADDITIONAL NOTES: OTHER

## 2023-12-12 PROCEDURE — 17262 DSTRJ MAL LES T/A/L 1.1-2.0: CPT

## 2023-12-12 PROCEDURE — OTHER PHOTO-DOCUMENTATION: OTHER

## 2023-12-12 RX ORDER — FLUOROURACIL 2 G/40G
CREAM TOPICAL
Qty: 40 | Refills: 0 | Status: ACTIVE

## 2023-12-12 NOTE — PROCEDURE: ADDITIONAL NOTES
Render Risk Assessment In Note?: no
Detail Level: Simple
Additional Notes: Discussed excision vs  EDC Procedure in detail with patient\\nDiscussed it that there is a lower cure rate for EDC \\nPatient is leaving for [place] in two weeks \\nHas very thin, fragile skin , high dehiscence risk\\n\\nWill follow up procedure today with Efudex cream twice a day for six weeks \\nRecommended that he follow up after his returns from  [place] in the spring

## 2024-04-16 ENCOUNTER — APPOINTMENT (OUTPATIENT)
Dept: URBAN - NONMETROPOLITAN AREA CLINIC 60 | Age: 89
Setting detail: DERMATOLOGY
End: 2024-04-16

## 2024-04-16 DIAGNOSIS — L57.8 OTHER SKIN CHANGES DUE TO CHRONIC EXPOSURE TO NONIONIZING RADIATION: ICD-10-CM

## 2024-04-16 DIAGNOSIS — L82.1 OTHER SEBORRHEIC KERATOSIS: ICD-10-CM

## 2024-04-16 DIAGNOSIS — L30.4 ERYTHEMA INTERTRIGO: ICD-10-CM

## 2024-04-16 DIAGNOSIS — L57.0 ACTINIC KERATOSIS: ICD-10-CM

## 2024-04-16 DIAGNOSIS — L81.4 OTHER MELANIN HYPERPIGMENTATION: ICD-10-CM

## 2024-04-16 DIAGNOSIS — L82.0 INFLAMED SEBORRHEIC KERATOSIS: ICD-10-CM

## 2024-04-16 PROCEDURE — 17003 DESTRUCT PREMALG LES 2-14: CPT | Mod: 59

## 2024-04-16 PROCEDURE — OTHER PRESCRIPTION MEDICATION MANAGEMENT: OTHER

## 2024-04-16 PROCEDURE — OTHER COUNSELING: OTHER

## 2024-04-16 PROCEDURE — 99213 OFFICE O/P EST LOW 20 MIN: CPT | Mod: 25

## 2024-04-16 PROCEDURE — OTHER LIQUID NITROGEN: OTHER

## 2024-04-16 PROCEDURE — OTHER SUNSCREEN RECOMMENDATIONS: OTHER

## 2024-04-16 PROCEDURE — OTHER PRESCRIPTION: OTHER

## 2024-04-16 PROCEDURE — OTHER MIPS QUALITY: OTHER

## 2024-04-16 PROCEDURE — 17000 DESTRUCT PREMALG LESION: CPT | Mod: 59

## 2024-04-16 PROCEDURE — 17110 DESTRUCT B9 LESION 1-14: CPT

## 2024-04-16 RX ORDER — KETOCONAZOLE 20 MG/G
CREAM TOPICAL
Qty: 60 | Refills: 3 | Status: ERX | COMMUNITY
Start: 2024-04-16

## 2024-04-16 RX ORDER — FLUOROURACIL 2 G/40G
CREAM TOPICAL
Qty: 40 | Refills: 1 | Status: ERX

## 2024-04-16 ASSESSMENT — LOCATION ZONE DERM
LOCATION ZONE: TRUNK
LOCATION ZONE: NECK
LOCATION ZONE: LEG
LOCATION ZONE: ARM
LOCATION ZONE: EAR
LOCATION ZONE: SCALP
LOCATION ZONE: FACE

## 2024-04-16 ASSESSMENT — LOCATION DETAILED DESCRIPTION DERM
LOCATION DETAILED: RIGHT INFERIOR HELIX
LOCATION DETAILED: LEFT SUPERIOR OCCIPITAL SCALP
LOCATION DETAILED: LEFT CENTRAL MALAR CHEEK
LOCATION DETAILED: LEFT POSTERIOR SHOULDER
LOCATION DETAILED: LEFT ANTERIOR PROXIMAL THIGH
LOCATION DETAILED: LEFT SUPERIOR LATERAL NECK
LOCATION DETAILED: SUPERIOR MID FOREHEAD
LOCATION DETAILED: LEFT CLAVICULAR NECK
LOCATION DETAILED: LEFT SUPERIOR HELIX
LOCATION DETAILED: RIGHT PROXIMAL DORSAL FOREARM
LOCATION DETAILED: RIGHT SUPERIOR CENTRAL MALAR CHEEK
LOCATION DETAILED: RIGHT FOREHEAD
LOCATION DETAILED: LEFT LATERAL INFERIOR CHEST
LOCATION DETAILED: LEFT INFERIOR CENTRAL MALAR CHEEK
LOCATION DETAILED: LEFT SUPERIOR FOREHEAD
LOCATION DETAILED: RIGHT ANTERIOR MEDIAL PROXIMAL THIGH
LOCATION DETAILED: RIGHT MEDIAL UPPER BACK

## 2024-04-16 ASSESSMENT — LOCATION SIMPLE DESCRIPTION DERM
LOCATION SIMPLE: LEFT EAR
LOCATION SIMPLE: LEFT THIGH
LOCATION SIMPLE: RIGHT FOREARM
LOCATION SIMPLE: RIGHT EAR
LOCATION SIMPLE: NECK
LOCATION SIMPLE: LEFT CHEEK
LOCATION SIMPLE: SUPERIOR FOREHEAD
LOCATION SIMPLE: CHEST
LOCATION SIMPLE: RIGHT FOREHEAD
LOCATION SIMPLE: LEFT OCCIPITAL SCALP
LOCATION SIMPLE: LEFT ANTERIOR NECK
LOCATION SIMPLE: RIGHT THIGH
LOCATION SIMPLE: LEFT FOREHEAD
LOCATION SIMPLE: RIGHT CHEEK
LOCATION SIMPLE: LEFT SHOULDER
LOCATION SIMPLE: RIGHT UPPER BACK

## 2024-04-16 ASSESSMENT — SEVERITY ASSESSMENT: SEVERITY: MODERATE

## 2024-04-16 ASSESSMENT — BSA RASH: BSA RASH: 3

## 2024-04-16 NOTE — PROCEDURE: LIQUID NITROGEN
Detail Level: Detailed
Render Note In Bullet Format When Appropriate: No
Show Applicator Variable?: Yes
Post-Care Instructions: I reviewed with the patient in detail post-care instructions. Patient is to wear sunprotection, and avoid picking at any of the treated lesions. Pt may apply Vaseline to crusted or scabbing areas.
Duration Of Freeze Thaw-Cycle (Seconds): 10
Number Of Freeze-Thaw Cycles: 1 freeze-thaw cycle
Consent: The patient's consent was obtained including but not limited to risks of crusting, scabbing, blistering, scarring, darker or lighter pigmentary change, recurrence, incomplete removal and infection.
Spray Paint Text: The liquid nitrogen was applied to the skin utilizing a spray paint frosting technique.
Medical Necessity Information: It is in your best interest to select a reason for this procedure from the list below. All of these items fulfill various CMS LCD requirements except the new and changing color options.
Medical Necessity Clause: This procedure was medically necessary because the lesions that were treated were:

## 2024-04-16 NOTE — PROCEDURE: PRESCRIPTION MEDICATION MANAGEMENT
Render In Strict Bullet Format?: No
Detail Level: Zone
Initiate Treatment: Efudex 5 % topical cream \\nApply small amount to affected area on arm bid for 2 weeks the stop
Initiate Treatment: ketoconazole 2 % topical cream \\nApply  to affected  area in groin bid as needed

## 2024-06-18 ENCOUNTER — APPOINTMENT (OUTPATIENT)
Dept: URBAN - NONMETROPOLITAN AREA CLINIC 60 | Age: 89
Setting detail: DERMATOLOGY
End: 2024-06-19

## 2024-06-18 DIAGNOSIS — L57.0 ACTINIC KERATOSIS: ICD-10-CM

## 2024-06-18 DIAGNOSIS — D69.2 OTHER NONTHROMBOCYTOPENIC PURPURA: ICD-10-CM

## 2024-06-18 PROCEDURE — OTHER LIQUID NITROGEN: OTHER

## 2024-06-18 PROCEDURE — OTHER COUNSELING: OTHER

## 2024-06-18 PROCEDURE — 17003 DESTRUCT PREMALG LES 2-14: CPT

## 2024-06-18 PROCEDURE — OTHER MIPS QUALITY: OTHER

## 2024-06-18 PROCEDURE — 99212 OFFICE O/P EST SF 10 MIN: CPT | Mod: 25

## 2024-06-18 PROCEDURE — 17000 DESTRUCT PREMALG LESION: CPT

## 2024-06-18 ASSESSMENT — LOCATION DETAILED DESCRIPTION DERM
LOCATION DETAILED: LEFT MEDIAL FRONTAL SCALP
LOCATION DETAILED: RIGHT LOWER CUTANEOUS LIP
LOCATION DETAILED: LEFT CENTRAL FRONTAL SCALP
LOCATION DETAILED: LEFT DISTAL RADIAL DORSAL FOREARM
LOCATION DETAILED: RIGHT PROXIMAL DORSAL FOREARM
LOCATION DETAILED: LEFT SUPERIOR LATERAL BUCCAL CHEEK

## 2024-06-18 ASSESSMENT — LOCATION SIMPLE DESCRIPTION DERM
LOCATION SIMPLE: LEFT CHEEK
LOCATION SIMPLE: LEFT SCALP
LOCATION SIMPLE: LEFT FOREARM
LOCATION SIMPLE: RIGHT LIP
LOCATION SIMPLE: RIGHT FOREARM

## 2024-06-18 ASSESSMENT — LOCATION ZONE DERM
LOCATION ZONE: LIP
LOCATION ZONE: SCALP
LOCATION ZONE: ARM
LOCATION ZONE: FACE

## 2024-06-18 NOTE — PROCEDURE: LIQUID NITROGEN
Render Note In Bullet Format When Appropriate: No
Post-Care Instructions: I reviewed with the patient in detail post-care instructions. Patient is to wear sunprotection, and avoid picking at any of the treated lesions. Pt may apply Vaseline to crusted or scabbing areas.
Duration Of Freeze Thaw-Cycle (Seconds): 10
Number Of Freeze-Thaw Cycles: 1 freeze-thaw cycle
Consent: The patient's consent was obtained including but not limited to risks of crusting, scabbing, blistering, scarring, darker or lighter pigmentary change, recurrence, incomplete removal and infection.
Show Aperture Variable?: Yes
Detail Level: Detailed